# Patient Record
Sex: FEMALE | Race: WHITE | Employment: FULL TIME | URBAN - METROPOLITAN AREA
[De-identification: names, ages, dates, MRNs, and addresses within clinical notes are randomized per-mention and may not be internally consistent; named-entity substitution may affect disease eponyms.]

---

## 2017-02-04 ENCOUNTER — HOSPITAL ENCOUNTER (EMERGENCY)
Age: 52
Discharge: HOME | End: 2017-02-04

## 2017-02-04 VITALS
TEMPERATURE: 97.9 F | BODY MASS INDEX: 23.04 KG/M2 | HEIGHT: 63 IN | WEIGHT: 130 LBS | DIASTOLIC BLOOD PRESSURE: 89 MMHG | RESPIRATION RATE: 20 BRPM | HEART RATE: 73 BPM | SYSTOLIC BLOOD PRESSURE: 130 MMHG | OXYGEN SATURATION: 97 %

## 2017-02-04 DIAGNOSIS — F32.A DEPRESSION, UNSPECIFIED DEPRESSION TYPE: ICD-10-CM

## 2017-02-04 DIAGNOSIS — H66.002 ACUTE SUPPURATIVE OTITIS MEDIA OF LEFT EAR WITHOUT SPONTANEOUS RUPTURE OF TYMPANIC MEMBRANE, RECURRENCE NOT SPECIFIED: Primary | ICD-10-CM

## 2017-02-04 LAB
ALBUMIN SERPL-MCNC: 4.6 G/DL (ref 3.9–4.9)
ALP BLD-CCNC: 97 U/L (ref 40–130)
ALT SERPL-CCNC: 7 U/L (ref 0–33)
AMPHETAMINE SCREEN, URINE: NORMAL
ANION GAP SERPL CALCULATED.3IONS-SCNC: 16 MEQ/L (ref 7–13)
AST SERPL-CCNC: 14 U/L (ref 0–35)
BARBITURATE SCREEN URINE: NORMAL
BASOPHILS ABSOLUTE: 0.1 K/UL (ref 0–0.2)
BASOPHILS RELATIVE PERCENT: 1.2 %
BENZODIAZEPINE SCREEN, URINE: NORMAL
BILIRUB SERPL-MCNC: 0.3 MG/DL (ref 0–1.2)
BILIRUBIN URINE: NEGATIVE
BLOOD, URINE: NEGATIVE
BUN BLDV-MCNC: 9 MG/DL (ref 6–20)
CALCIUM SERPL-MCNC: 9.9 MG/DL (ref 8.6–10.2)
CANNABINOID SCREEN URINE: NORMAL
CHLORIDE BLD-SCNC: 105 MEQ/L (ref 98–107)
CLARITY: CLEAR
CO2: 21 MEQ/L (ref 22–29)
COCAINE METABOLITE SCREEN URINE: NORMAL
COLOR: YELLOW
CREAT SERPL-MCNC: 0.57 MG/DL (ref 0.5–0.9)
EOSINOPHILS ABSOLUTE: 0.1 K/UL (ref 0–0.7)
EOSINOPHILS RELATIVE PERCENT: 1.5 %
ETHANOL PERCENT: NORMAL G/DL
ETHANOL: <10 MG/DL (ref 0–0.08)
GFR AFRICAN AMERICAN: >60
GFR NON-AFRICAN AMERICAN: >60
GLOBULIN: 2.1 G/DL (ref 2.3–3.5)
GLUCOSE BLD-MCNC: 93 MG/DL (ref 74–109)
GLUCOSE URINE: NEGATIVE MG/DL
HCG QUALITATIVE: NEGATIVE
HCT VFR BLD CALC: 44.9 % (ref 37–47)
HEMOGLOBIN: 15.2 G/DL (ref 12–16)
KETONES, URINE: NEGATIVE MG/DL
LEUKOCYTE ESTERASE, URINE: NEGATIVE
LYMPHOCYTES ABSOLUTE: 2.6 K/UL (ref 1–4.8)
LYMPHOCYTES RELATIVE PERCENT: 25.9 %
Lab: NORMAL
MCH RBC QN AUTO: 31.6 PG (ref 27–31.3)
MCHC RBC AUTO-ENTMCNC: 33.7 % (ref 33–37)
MCV RBC AUTO: 93.5 FL (ref 82–100)
MONOCYTES ABSOLUTE: 0.6 K/UL (ref 0.2–0.8)
MONOCYTES RELATIVE PERCENT: 5.6 %
NEUTROPHILS ABSOLUTE: 6.7 K/UL (ref 1.4–6.5)
NEUTROPHILS RELATIVE PERCENT: 65.8 %
NITRITE, URINE: NEGATIVE
OPIATE SCREEN URINE: NORMAL
PDW BLD-RTO: 12.8 % (ref 11.5–14.5)
PH UA: 6.5 (ref 5–9)
PHENCYCLIDINE SCREEN URINE: NORMAL
PLATELET # BLD: 171 K/UL (ref 130–400)
POTASSIUM SERPL-SCNC: 4.1 MEQ/L (ref 3.5–5.1)
PROTEIN UA: NEGATIVE MG/DL
RBC # BLD: 4.8 M/UL (ref 4.2–5.4)
SODIUM BLD-SCNC: 142 MEQ/L (ref 132–144)
SPECIFIC GRAVITY UA: 1.02 (ref 1–1.03)
TOTAL PROTEIN: 6.7 G/DL (ref 6.4–8.1)
URINE REFLEX TO CULTURE: NORMAL
UROBILINOGEN, URINE: 0.2 E.U./DL
WBC # BLD: 10.1 K/UL (ref 4.8–10.8)

## 2017-02-04 PROCEDURE — 99283 EMERGENCY DEPT VISIT LOW MDM: CPT

## 2017-02-04 PROCEDURE — 80053 COMPREHEN METABOLIC PANEL: CPT

## 2017-02-04 PROCEDURE — 85025 COMPLETE CBC W/AUTO DIFF WBC: CPT

## 2017-02-04 PROCEDURE — 84703 CHORIONIC GONADOTROPIN ASSAY: CPT

## 2017-02-04 PROCEDURE — 6370000000 HC RX 637 (ALT 250 FOR IP): Performed by: NURSE PRACTITIONER

## 2017-02-04 PROCEDURE — 80307 DRUG TEST PRSMV CHEM ANLYZR: CPT

## 2017-02-04 PROCEDURE — 81003 URINALYSIS AUTO W/O SCOPE: CPT

## 2017-02-04 PROCEDURE — G0480 DRUG TEST DEF 1-7 CLASSES: HCPCS

## 2017-02-04 PROCEDURE — 36415 COLL VENOUS BLD VENIPUNCTURE: CPT

## 2017-02-04 RX ORDER — AMOXICILLIN AND CLAVULANATE POTASSIUM 875; 125 MG/1; MG/1
1 TABLET, FILM COATED ORAL 2 TIMES DAILY
Qty: 20 TABLET | Refills: 0 | Status: SHIPPED | OUTPATIENT
Start: 2017-02-04 | End: 2017-02-14

## 2017-02-04 RX ORDER — IBUPROFEN 400 MG/1
400 TABLET ORAL EVERY 8 HOURS PRN
Status: DISCONTINUED | OUTPATIENT
Start: 2017-02-04 | End: 2017-02-04 | Stop reason: HOSPADM

## 2017-02-04 RX ORDER — AMOXICILLIN AND CLAVULANATE POTASSIUM 875; 125 MG/1; MG/1
1 TABLET, FILM COATED ORAL EVERY 12 HOURS SCHEDULED
Status: DISCONTINUED | OUTPATIENT
Start: 2017-02-04 | End: 2017-02-04 | Stop reason: HOSPADM

## 2017-02-04 RX ORDER — IBUPROFEN 600 MG/1
600 TABLET ORAL EVERY 6 HOURS PRN
Qty: 30 TABLET | Refills: 0 | Status: SHIPPED | OUTPATIENT
Start: 2017-02-04 | End: 2020-09-29

## 2017-02-04 RX ORDER — AMOXICILLIN 875 MG/1
875 TABLET, COATED ORAL 2 TIMES DAILY
Qty: 20 TABLET | Refills: 0 | Status: SHIPPED | OUTPATIENT
Start: 2017-02-04 | End: 2017-02-14

## 2017-02-04 RX ADMIN — IBUPROFEN 400 MG: 400 TABLET, FILM COATED ORAL at 12:03

## 2017-02-04 RX ADMIN — IBUPROFEN 400 MG: 400 TABLET, FILM COATED ORAL at 12:02

## 2017-02-04 RX ADMIN — AMOXICILLIN AND CLAVULANATE POTASSIUM 1 TABLET: 875; 125 TABLET, FILM COATED ORAL at 11:59

## 2017-02-04 ASSESSMENT — ENCOUNTER SYMPTOMS
SORE THROAT: 0
RHINORRHEA: 0
ABDOMINAL PAIN: 0
VOMITING: 0
DIARRHEA: 0
COUGH: 0
HYPERVENTILATION: 0

## 2017-02-04 ASSESSMENT — PAIN SCALES - GENERAL
PAINLEVEL_OUTOF10: 6

## 2019-04-11 ENCOUNTER — HOSPITAL ENCOUNTER (OUTPATIENT)
Dept: GENERAL RADIOLOGY | Age: 54
Discharge: HOME | End: 2019-04-11

## 2019-04-11 DIAGNOSIS — S67.195A CRUSHING INJURY OF LEFT RING FINGER, INITIAL ENCOUNTER: ICD-10-CM

## 2020-01-18 ENCOUNTER — OFFICE VISIT (OUTPATIENT)
Dept: FAMILY MEDICINE CLINIC | Age: 55
End: 2020-01-18

## 2020-01-18 VITALS
OXYGEN SATURATION: 98 % | TEMPERATURE: 98.3 F | HEIGHT: 63 IN | BODY MASS INDEX: 21.65 KG/M2 | WEIGHT: 122.2 LBS | RESPIRATION RATE: 14 BRPM | DIASTOLIC BLOOD PRESSURE: 68 MMHG | SYSTOLIC BLOOD PRESSURE: 132 MMHG | HEART RATE: 67 BPM

## 2020-01-18 PROCEDURE — 99213 OFFICE O/P EST LOW 20 MIN: CPT | Performed by: NURSE PRACTITIONER

## 2020-01-18 RX ORDER — CIPROFLOXACIN HYDROCHLORIDE 3.5 MG/ML
SOLUTION/ DROPS TOPICAL
Qty: 1 BOTTLE | Refills: 0 | Status: SHIPPED | OUTPATIENT
Start: 2020-01-18 | End: 2020-01-25

## 2020-01-18 RX ORDER — CIPROFLOXACIN HYDROCHLORIDE 3.5 MG/ML
SOLUTION/ DROPS TOPICAL
Qty: 1 BOTTLE | Refills: 0 | Status: SHIPPED | OUTPATIENT
Start: 2020-01-18 | End: 2020-01-18

## 2020-01-18 ASSESSMENT — PATIENT HEALTH QUESTIONNAIRE - PHQ9
2. FEELING DOWN, DEPRESSED OR HOPELESS: 0
SUM OF ALL RESPONSES TO PHQ QUESTIONS 1-9: 0
SUM OF ALL RESPONSES TO PHQ QUESTIONS 1-9: 0
SUM OF ALL RESPONSES TO PHQ9 QUESTIONS 1 & 2: 0
1. LITTLE INTEREST OR PLEASURE IN DOING THINGS: 0

## 2020-01-18 ASSESSMENT — ENCOUNTER SYMPTOMS
DOUBLE VISION: 0
EYE DISCHARGE: 0
EYE REDNESS: 1
EYE ITCHING: 1
EYE PAIN: 1
FOREIGN BODY SENSATION: 0
BLURRED VISION: 0
PHOTOPHOBIA: 0

## 2020-01-18 NOTE — PROGRESS NOTES
Lifestyle    Physical activity:     Days per week: Not on file     Minutes per session: Not on file    Stress: Not on file   Relationships    Social connections:     Talks on phone: Not on file     Gets together: Not on file     Attends Orthodox service: Not on file     Active member of club or organization: Not on file     Attends meetings of clubs or organizations: Not on file     Relationship status: Not on file    Intimate partner violence:     Fear of current or ex partner: Not on file     Emotionally abused: Not on file     Physically abused: Not on file     Forced sexual activity: Not on file   Other Topics Concern    Not on file   Social History Narrative    Not on file     History reviewed. No pertinent family history. No Known Allergies      Review of Systems   Constitutional: Negative for activity change, appetite change and fever. Eyes: Positive for pain, redness and itching. Negative for blurred vision, double vision, photophobia, discharge and visual disturbance. Objective:   /68 (Site: Left Upper Arm, Position: Sitting, Cuff Size: Medium Adult)   Pulse 67   Temp 98.3 °F (36.8 °C) (Temporal)   Resp 14   Ht 5' 3\" (1.6 m)   Wt 122 lb 3.2 oz (55.4 kg)   SpO2 98%   BMI 21.65 kg/m²     Physical Exam  Vitals signs reviewed. Constitutional:       General: She is not in acute distress. Appearance: Normal appearance. She is not ill-appearing. HENT:      Head: Normocephalic and atraumatic. Eyes:      General: Lids are normal.      Extraocular Movements: Extraocular movements intact. Conjunctiva/sclera:      Right eye: Hemorrhage present. Pupils: Pupils are equal, round, and reactive to light. Cardiovascular:      Rate and Rhythm: Normal rate and regular rhythm. Pulses: Normal pulses. Heart sounds: Normal heart sounds. Pulmonary:      Effort: Pulmonary effort is normal.      Breath sounds: Normal breath sounds. No wheezing, rhonchi or rales. Skin:     General: Skin is warm and dry. Capillary Refill: Capillary refill takes less than 2 seconds. Neurological:      Mental Status: She is alert and oriented to person, place, and time. Psychiatric:         Mood and Affect: Mood normal.         Assessment:       Diagnosis Orders   1. Abrasion of right cornea, initial encounter  Amb External Referral To Ophthalmology   2. Acute bacterial conjunctivitis of right eye  Amb External Referral To Ophthalmology    ciprofloxacin (CILOXAN) 0.3 % ophthalmic solution    DISCONTINUED: ciprofloxacin (CILOXAN) 0.3 % ophthalmic solution      No results found for this visit on 01/18/20. Plan:   Call your doctor now or seek immediate medical care if:    · You have signs of an eye infection, such as:  ? Pus or thick discharge coming from the eye.  ? Redness or swelling around the eye.  ? A fever.     · You have new or worse eye pain.     · You have vision changes.     · It feels like there is something in your eye.     · Light hurts your eye.    Watch closely for changes in your health, and be sure to contact your doctor if:    · You do not get better as expected. Discussed with patient/family worsening signs and symptoms as outlined above as to when to return for care/seek emergent help. Assessment & Plan   Inez Smith was seen today for eye problem. Diagnoses and all orders for this visit:    Abrasion of right cornea, initial encounter  -     Amb External Referral To Ophthalmology    Acute bacterial conjunctivitis of right eye  -     Discontinue: ciprofloxacin (CILOXAN) 0.3 % ophthalmic solution; 2 drops to affected eye(s) Q2H x 2 days, then Q4H x 5 days while awake. -     Amb External Referral To Ophthalmology  -     ciprofloxacin (CILOXAN) 0.3 % ophthalmic solution; 2 drops to affected eye(s) Q2H x 2 days, then Q4H x 5 days while awake.       Orders Placed This Encounter   Procedures    Amb External Referral To Ophthalmology     Referral Priority: Urgent     Referral Type:   Eval and Treat     Referral Reason:   Specialty Services Required     Referred to Provider:   Peggy Morgan     Requested Specialty:   Optometry     Number of Visits Requested:   1     Orders Placed This Encounter   Medications    DISCONTD: ciprofloxacin (CILOXAN) 0.3 % ophthalmic solution     Si drops to affected eye(s) Q2H x 2 days, then Q4H x 5 days while awake. Dispense:  1 Bottle     Refill:  0    ciprofloxacin (CILOXAN) 0.3 % ophthalmic solution     Si drops to affected eye(s) Q2H x 2 days, then Q4H x 5 days while awake. Dispense:  1 Bottle     Refill:  0     Medications Discontinued During This Encounter   Medication Reason    sertraline (ZOLOFT) 50 MG tablet LIST CLEANUP    ciprofloxacin (CILOXAN) 0.3 % ophthalmic solution      Return for Follow up w/ PCP if symptoms worsen or go to ED. Reviewed with the patient/family: current clinical status & medications. Side effects of the medication prescribed today, as well as treatment plan/rationale and result expectations have been discussed with the patient/family who expresses understanding. Follow up with PCP to evaluate treatment results or return if symptoms worsen or fail to improve. I have reviewed the patient's medical history in detail and updated the computerized patient record.     Arie Kidney, APRN - NP

## 2020-01-18 NOTE — PATIENT INSTRUCTIONS
Patient Education        Corneal Scratches: Care Instructions  Your Care Instructions    The cornea is the clear surface that covers the front of the eye. When a speck of dirt, a wood chip, an insect, or another object flies into your eye, it can cause a painful scratch on the cornea. Wearing contact lenses too long or rubbing your eyes can also scratch the cornea. Small scratches usually heal in a day or two. Deeper scratches may take longer. If you have had a foreign object removed from your eye or you have a corneal scratch, you will need to watch for infection and vision problems while your eye heals. Follow-up care is a key part of your treatment and safety. Be sure to make and go to all appointments, and call your doctor if you are having problems. It's also a good idea to know your test results and keep a list of the medicines you take. How can you care for yourself at home? · The doctor probably used a medicine during your exam to numb your eye. When it wears off in 30 to 60 minutes, your eye pain may come back. Take pain medicines exactly as directed. ? If the doctor gave you a prescription medicine for pain, take it as prescribed. ? If you are not taking a prescription pain medicine, ask your doctor if you can take an over-the-counter medicine. ? Do not take two or more pain medicines at the same time unless the doctor told you to. Many pain medicines have acetaminophen, which is Tylenol. Too much acetaminophen (Tylenol) can be harmful. · Do not rub your injured eye. Rubbing can make it worse. · Use the prescribed eyedrops or ointment as directed. Be sure the dropper or bottle tip is clean. To put in eyedrops or ointment:  ? Tilt your head back, and pull your lower eyelid down with one finger. ? Drop or squirt the medicine inside the lower lid. ? Close your eye for 30 to 60 seconds to let the drops or ointment move around.   ? Do not touch the ointment or dropper tip to your eyelashes or any other surface. · Do not use your contact lens in your hurt eye until your doctor says you can. Also, do not wear eye makeup until your eye has healed. · Do not drive if you have blurred vision. · Bright light may hurt. Sunglasses can help. · To prevent eye injuries in the future, wear safety glasses or goggles when you work with machines or tools, mow the lawn, or ride a bike or motorcycle. When should you call for help? Call your doctor now or seek immediate medical care if:    · You have signs of an eye infection, such as:  ? Pus or thick discharge coming from the eye.  ? Redness or swelling around the eye.  ? A fever.     · You have new or worse eye pain.     · You have vision changes.     · It feels like there is something in your eye.     · Light hurts your eye.    Watch closely for changes in your health, and be sure to contact your doctor if:    · You do not get better as expected. Where can you learn more? Go to https://[employer]. org and sign in to your [employer] account. Enter D359 in the [facility] box to learn more about \"Corneal Scratches: Care Instructions. \"     If you do not have an account, please click on the \"Sign Up Now\" link. Current as of: May 5, 2019  Content Version: 12.3  © 8523-7778 Healthwise, Incorporated. Care instructions adapted under license by [facility] ([facility]). If you have questions about a medical condition or this instruction, always ask your healthcare professional. [place] [address] any warranty or liability for your use of this information. Patient Education        Pinkeye: Care Instructions  Your Care Instructions    Pinkeye is redness and swelling of the eye surface and the conjunctiva (the lining of the eyelid and the covering of the white part of the eye). Pinkeye is also called conjunctivitis. Pinkeye is often caused by infection with bacteria or a virus.  Dry air, allergies, smoke, and chemicals are other common causes. Pinkeye often clears on its own in 7 to 10 days. Antibiotics only help if the pinkeye is caused by bacteria. Pinkeye caused by infection spreads easily. If an allergy or chemical is causing pinkeye, it will not go away unless you can avoid whatever is causing it. Follow-up care is a key part of your treatment and safety. Be sure to make and go to all appointments, and call your doctor if you are having problems. It's also a good idea to know your test results and keep a list of the medicines you take. How can you care for yourself at home? · Wash your hands often. Always wash them before and after you treat pinkeye or touch your eyes or face. · Use moist cotton or a clean, wet cloth to remove crust. Wipe from the inside corner of the eye to the outside. Use a clean part of the cloth for each wipe. · Put cold or warm wet cloths on your eye a few times a day if the eye hurts. · Do not wear contact lenses or eye makeup until the pinkeye is gone. Throw away any eye makeup you were using when you got pinkeye. Clean your contacts and storage case. If you wear disposable contacts, use a new pair when your eye has cleared and it is safe to wear contacts again. · If the doctor gave you antibiotic ointment or eyedrops, use them as directed. Use the medicine for as long as instructed, even if your eye starts looking better soon. Keep the bottle tip clean, and do not let it touch the eye area. · To put in eyedrops or ointment:  ? Tilt your head back, and pull your lower eyelid down with one finger. ? Drop or squirt the medicine inside the lower lid. ? Close your eye for 30 to 60 seconds to let the drops or ointment move around. ? Do not touch the ointment or dropper tip to your eyelashes or any other surface. · Do not share towels, pillows, or washcloths while you have pinkeye. When should you call for help?   Call your doctor now or seek immediate medical care if:    · You have pain in your eye, not just irritation on the surface.     · You have a change in vision or loss of vision.     · You have an increase in discharge from the eye.     · Your eye has not started to improve or begins to get worse within 48 hours after you start using antibiotics.     · Pinkeye lasts longer than 7 days.    Watch closely for changes in your health, and be sure to contact your doctor if you have any problems. Where can you learn more? Go to https://Mobicowpepiceweb.[employer] [email or web address] org and sign in to your [employer] account. Enter Y392 in the [employer] box to learn more about \"Pinkeye: Care Instructions. \"     If you do not have an account, please click on the \"Sign Up Now\" link. Current as of: June 26, 2019  Content Version: 12.3  © 0607-2700 Healthwise, Incorporated. Care instructions adapted under license by [facility] ([facility]). If you have questions about a medical condition or this instruction, always ask your healthcare professional. [address] any warranty or liability for your use of this information.

## 2020-09-29 ENCOUNTER — HOSPITAL ENCOUNTER (EMERGENCY)
Age: 55
Discharge: HOME | End: 2020-09-29
Attending: EMERGENCY MEDICINE
Payer: COMMERCIAL

## 2020-09-29 ENCOUNTER — APPOINTMENT (OUTPATIENT)
Dept: CT IMAGING | Age: 55
End: 2020-09-29
Payer: COMMERCIAL

## 2020-09-29 VITALS
TEMPERATURE: 99.8 F | DIASTOLIC BLOOD PRESSURE: 68 MMHG | HEIGHT: 62 IN | OXYGEN SATURATION: 97 % | BODY MASS INDEX: 21.16 KG/M2 | HEART RATE: 56 BPM | SYSTOLIC BLOOD PRESSURE: 124 MMHG | WEIGHT: 115 LBS | RESPIRATION RATE: 16 BRPM

## 2020-09-29 PROCEDURE — 96372 THER/PROPH/DIAG INJ SC/IM: CPT

## 2020-09-29 PROCEDURE — 99284 EMERGENCY DEPT VISIT MOD MDM: CPT

## 2020-09-29 PROCEDURE — 70450 CT HEAD/BRAIN W/O DYE: CPT

## 2020-09-29 PROCEDURE — 6360000002 HC RX W HCPCS: Performed by: EMERGENCY MEDICINE

## 2020-09-29 RX ORDER — KETOROLAC TROMETHAMINE 30 MG/ML
30 INJECTION, SOLUTION INTRAMUSCULAR; INTRAVENOUS ONCE
Status: COMPLETED | OUTPATIENT
Start: 2020-09-29 | End: 2020-09-29

## 2020-09-29 RX ADMIN — KETOROLAC TROMETHAMINE 30 MG: 30 INJECTION, SOLUTION INTRAMUSCULAR; INTRAVENOUS at 02:42

## 2020-09-29 ASSESSMENT — PAIN DESCRIPTION - FREQUENCY: FREQUENCY: CONTINUOUS

## 2020-09-29 ASSESSMENT — PAIN SCALES - GENERAL
PAINLEVEL_OUTOF10: 8
PAINLEVEL_OUTOF10: 8

## 2020-09-29 ASSESSMENT — PAIN DESCRIPTION - LOCATION: LOCATION: HEAD

## 2020-09-29 ASSESSMENT — PAIN DESCRIPTION - DESCRIPTORS: DESCRIPTORS: STABBING;SHARP

## 2020-09-29 ASSESSMENT — PAIN DESCRIPTION - PAIN TYPE: TYPE: ACUTE PAIN

## 2020-09-29 NOTE — ED NOTES
Patient is alert and oriented in room. Skin is pink, warm, dry. Respirations are even and non-labored. Patient is sitting up on cart conversing with visitor that is in room. Passed swallow evaluation.      Patti Barrett, RN  09/29/20 [id number] Sad[address] Olivares  Lasha Reddy, RAJANI  09/29/20 [id number]

## 2020-09-29 NOTE — ED TRIAGE NOTES
Pt c/o headache since Ryley morning. States she was at work pushing a cart of supplies when a case of batteries fell onto her head. States she has had a headache ever since then. Pt denies LOC. Pt alert and oriented x 4. Skin pink, warm, dry. Respirations even and unlabored. No distress noted at this time.

## 2020-09-29 NOTE — ED NOTES
Call placed to lab to check for post-accident drug/alcohol screening.      Miguel A Stephenson RN  09/29/20 [id number]

## 2020-10-26 ENCOUNTER — HOSPITAL ENCOUNTER (OUTPATIENT)
Dept: OCCUPATIONAL THERAPY | Age: 55
Setting detail: THERAPIES SERIES
Discharge: HOME | End: 2020-10-26
Payer: COMMERCIAL

## 2020-10-26 PROCEDURE — 97166 OT EVAL MOD COMPLEX 45 MIN: CPT

## 2020-10-28 ENCOUNTER — HOSPITAL ENCOUNTER (OUTPATIENT)
Dept: OCCUPATIONAL THERAPY | Age: 55
Setting detail: THERAPIES SERIES
Discharge: HOME | End: 2020-10-28
Payer: COMMERCIAL

## 2020-10-28 PROCEDURE — 97012 MECHANICAL TRACTION THERAPY: CPT

## 2020-10-28 PROCEDURE — 97140 MANUAL THERAPY 1/> REGIONS: CPT

## 2020-10-28 PROCEDURE — 97530 THERAPEUTIC ACTIVITIES: CPT

## 2020-10-28 NOTE — PROGRESS NOTES
turning toward Right side only. Instructed pt. to be aware of her posture in standing, sitting and lying supine. Education/HEP: supine chin tuck HEP. Discussed previous HEP: n/a, N/A    Comments:   Pt. demonstrates good understanding of HEP. Assessment:   Pt tolerated treatment well. Pt reported increased  pain after OT treatment. Noted improved posture of head, neck and shoulders after treatment.     Plan:   Continue [facility] [place], OTR/L   10/28/2020  5:35 PM

## 2020-11-02 ENCOUNTER — HOSPITAL ENCOUNTER (OUTPATIENT)
Dept: OCCUPATIONAL THERAPY | Age: 55
Setting detail: THERAPIES SERIES
Discharge: HOME | End: 2020-11-02
Payer: COMMERCIAL

## 2020-11-02 PROCEDURE — 97530 THERAPEUTIC ACTIVITIES: CPT

## 2020-11-02 PROCEDURE — 97140 MANUAL THERAPY 1/> REGIONS: CPT

## 2020-11-02 PROCEDURE — 97012 MECHANICAL TRACTION THERAPY: CPT

## 2020-11-02 NOTE — PROGRESS NOTES
Occupational Therapy  Daily Note     Name: Chen Betts  : 1965  MRN: [id number]  Diagnosis: cervical sprain    Visit Information:   Onset Date: 20  OT Insurance Information: Leda Goncalves  Progress Note Counter: 3    Date: 2020  Time:   OT Mechanical traction for 10 minutes for 1 unit, CPT 66892    1:02-1:12 pm  OT Manual therapy 35 minutes for 2 unit(s), CPT 44079    1:12-1:47 pm  OT Therapeutic activities 12 minutes for 1 unit(s), CPT 67349   1:47-1:59 pm  Moist heat at end of session x 13 minutes(no charge)   1:59-2:12 pm     OT Individual Minutes  Time In: 1302  Time Out: 9421  Minutes: 70    Referring Practitioner: Dr. Tia Pugh       Subjective:     \"I was really sore after you treated me last time, but I actually feel better in my neck area. I am having more issues today a little farther down, almost between my shoulder blades. \"    Pain rating:   Pre-treatment pain:    4/10  Cervical spine    5-6/10  thoracic spine    Action for pain:   mechanical traction cervical area. Pain after treatment:      5/10         Focus of treatment was on the following:   decreasing fascial restrictions and decreasing pain     Objective:    Treatment Activity:     Modality:     mechanical traction cervical spine x 10 minutes at 10 lb. pressure while supine. MFR/Manual:   Craniosacral release:  Occipital release with anterior chest release while supine. Bilateral trapezius muscle massage and trigger point releases tolerated while supine. Pt. moved to prone position with pillows under chest and rib cage , for cross hands technique along paraspinals between scapulas. Scapular mobilization performed to left scapula. In four point position, pt. performed gentle spinal flexion/extension with neck flexion/extension AROM with mild increase in pain. In sitting position, instructed pt. in postural techniques to decrease forward head position.   Pt. has difficulty maintaining head in midline due to pain.      Provided pt. with moist heat cervical and trapezius area after treatment session to assist with decreasing pain. Education/HEP: postural techniques     Discussed previous HEP: yes, Pt verbally confirmed compliant with HEP's    Comments:     Assessment:   Pt tolerated treatment fairly well. Pain complaints this date in posterior cervical area and mid thoracic area. Pain increased with improved alignment of head and neck, but pt. aware of need for improved postural alignment. Pt. demonstrates good understanding of HEP. Plan:   Continue POC    Goals:     Long term goals  Long term goal 1: Patient will report pain 3-4 or less during functional activities. Long term goal 2: Patient  will be independent with all recommended HEP's, adaptive strategies, and adaptive techniques. Long term goal 3: Patient will report decreased frequency of numbness/tingling in BUEs  Long term goal 4: Patient  will increase BUE shoulder strength from current by 1/2  to increase performance with I/ADL's. Long term goal 5: Patient will decrease fascial restrictions in neck and trapezius muscles to decrease pain during functional activities  Long term goals 6: Patient will be IND with ECWS techniques  Long term goal 7: Pt. will demonstrate good postural set for head and neck during all lifting tasks and standing for improved work tolerance.     JULIAN Cobb/L   11/2/2020  3:47 PM

## 2020-11-04 ENCOUNTER — HOSPITAL ENCOUNTER (OUTPATIENT)
Dept: OCCUPATIONAL THERAPY | Age: 55
Setting detail: THERAPIES SERIES
Discharge: HOME | End: 2020-11-04
Payer: COMMERCIAL

## 2020-11-04 PROCEDURE — 97012 MECHANICAL TRACTION THERAPY: CPT

## 2020-11-04 PROCEDURE — 97530 THERAPEUTIC ACTIVITIES: CPT

## 2020-11-04 PROCEDURE — 97140 MANUAL THERAPY 1/> REGIONS: CPT

## 2020-11-04 NOTE — PROGRESS NOTES
Occupational Therapy  Daily Note     Name: Ritu Coronado  : 1965  MRN: [id number]  Diagnosis: cervical sprain    Visit Information:   Onset Date: 20  OT Insurance Information: [employer]  Progress Note Counter: 4    Date: 2020  Time:   OT Manual therapy 37 minutes for 2 unit(s), CPT 56236    1:15-1:52 PM  OT Therapeutic activities 10 minutes for 1 unit(s), CPT 08017   1:52-2:O2 PM  OT Mechanical Traction 13 minutes for 1 unit(s), CPT 97457    1:02-1:15 PM       OT Individual Minutes  Time In: 0534  Time Out: 3234  Minutes: 61    Referring Practitioner: Dr. Oziel Tobar       Subjective:     \"I was really sore and painful after our last session, but today I could move better. \"    Pain rating:   Pre-treatment pain:    5/10  B trap muscles and posterior neck. Action for pain:   Patient reports pain is at acceptable level for treatment. Pain after treatment:      4/10         Focus of treatment was on the following:   decreasing fascial restrictions and decreasing pain     Objective:    Treatment Activity:     Modality:     Mechanical traction cervical spine x 10 lb. for 10 minutes while supine on mat. MFR/Manual:   Pt. tolerated soft tissue massage and mobilization of bilateral trapezius muscles while supine on mat. While maintaining compression downward on B trap muscle, pt. laterally flexed neck side to side. Transverse plane release completed anterior chest and thoracic spine to relieve tightness of anterior chest.  PROM and stretch provided to bilateral pectoralis muscles in supine also. Pt. performed postural techniques while supine with improved alignment noted. In seated position, pt. instructed in and performed cervical and postural alignment activities with good results. Pt. demonstrates good understanding and follow through. Discussed previous HEP: yes, Pt verbally confirmed compliant with HEP's    Comments:     Assessment:   Pt tolerated treatment well.   Pain reduced

## 2020-11-09 ENCOUNTER — HOSPITAL ENCOUNTER (OUTPATIENT)
Dept: OCCUPATIONAL THERAPY | Age: 55
Setting detail: THERAPIES SERIES
Discharge: HOME | End: 2020-11-09
Payer: COMMERCIAL

## 2020-11-09 PROCEDURE — 97012 MECHANICAL TRACTION THERAPY: CPT

## 2020-11-09 PROCEDURE — 97530 THERAPEUTIC ACTIVITIES: CPT

## 2020-11-09 PROCEDURE — 97140 MANUAL THERAPY 1/> REGIONS: CPT

## 2020-11-09 NOTE — PROGRESS NOTES
Occupational Therapy  Daily Note     Name: Royal Alcaraz  : 1965  MRN: [id number]  Diagnosis: cervical sprain    Visit Information:   Onset Date: 20  OT Insurance Information: [employer]  Progress Note Counter: 5    Date: 2020  Time:   OT Manual therapy 37 minutes for 2 unit(s), CPT 46036      1:20-1:57  pm  OT Therapeutic activities 8 minutes for 1 unit(s), CPT 01959   1:57-2:05 pm  OT mechanical traction 10 minutes for 1 unit(s), CPT 88614   1:10-1:20 pm     OT Individual Minutes  Time In: 1310  Time Out: 5500  Minutes: 54    Referring Practitioner: Dr. Ra Jung       Subjective:     \"I am feeling so much better in my shoulders and head. \"    Pain rating:   Pre-treatment pain:    3/10    Action for pain:   No action necessary. Pain after treatment:      6/10         Focus of treatment was on the following:   decreasing fascial restrictions and decreasing pain     Objective:    Treatment Activity:     Modality:     mechanical traction cervical spine while supine x 10 minutes with 10 lbs. MFR/Manual:   Cervical: cervical hold with anterior chest release and light manual cervical traction with side bending, each direction. Soft tissue mobilization posterior neck and upper thoracic area with light downward compression of shoulder girdle. Transverse plane release completed over left lateral chest and posterior scapula due to report of discomfort. Pt. followed through with slow controlled AROM of head, neck and scapular retraction BUEs. BUE AROM shoulder flexion and horizontal ab/adduction completed with straight cane while supine. In prone, pt. transitioned to prone prop on forearms with trunk and neck extension activities. Pt. able to hold posture for 30 second intervals. Education:  Continued with instruction regarding posture during all ADL. Discussed previous HEP: yes, Pt verbally confirmed compliant with HEP's    Comments:     Assessment:   Pt tolerated treatment well. Pt. tends to have slight increase in pain following treatment, however reports improved symptoms and mobility after several hours. Pt. progressing well. Pt. reports she was able to return to work on light duty performing her  position. Plan:   Continue POC    Goals:     Long term goals  Long term goal 1: Patient will report pain 3-4 or less during functional activities. Long term goal 2: Patient  will be independent with all recommended HEP's, adaptive strategies, and adaptive techniques. Long term goal 3: Patient will report decreased frequency of numbness/tingling in BUEs  Long term goal 4: Patient  will increase BUE shoulder strength from current by 1/2  to increase performance with I/ADL's. Long term goal 5: Patient will decrease fascial restrictions in neck and trapezius muscles to decrease pain during functional activities  Long term goals 6: Patient will be IND with ECWS techniques  Long term goal 7: Pt. will demonstrate good postural set for head and neck during all lifting tasks and standing for improved work tolerance.     Clive Alejo, OTR/L   11/9/2020  4:39 PM

## 2020-11-11 ENCOUNTER — HOSPITAL ENCOUNTER (OUTPATIENT)
Dept: OCCUPATIONAL THERAPY | Age: 55
Setting detail: THERAPIES SERIES
Discharge: HOME | End: 2020-11-11
Payer: COMMERCIAL

## 2020-11-11 PROCEDURE — 97012 MECHANICAL TRACTION THERAPY: CPT

## 2020-11-11 PROCEDURE — 97140 MANUAL THERAPY 1/> REGIONS: CPT

## 2020-11-11 PROCEDURE — 97530 THERAPEUTIC ACTIVITIES: CPT

## 2020-11-16 ENCOUNTER — HOSPITAL ENCOUNTER (OUTPATIENT)
Dept: OCCUPATIONAL THERAPY | Age: 55
Setting detail: THERAPIES SERIES
Discharge: HOME | End: 2020-11-16
Payer: COMMERCIAL

## 2020-11-16 PROCEDURE — 97140 MANUAL THERAPY 1/> REGIONS: CPT

## 2020-11-16 PROCEDURE — 97530 THERAPEUTIC ACTIVITIES: CPT

## 2020-11-16 PROCEDURE — 97012 MECHANICAL TRACTION THERAPY: CPT

## 2020-11-16 NOTE — PROGRESS NOTES
Occupational Therapy  Daily Note     Name: Corina Maza  : 1965  MRN: [id number]  Diagnosis: cervical sprain    Visit Information:   Onset Date: 20  [facility]  Progress Note Counter: 7    Date: 2020  Time:   OT Mechanical traction 10 minutes for 1 unit(s), CPT 23104  1:01-1:11 PM  OT Manual therapy 13 minutes for 1 unit(s), CPT 41187    1:11-1:24 PM  OT Therapeutic activities 36 minutes for 2 unit(s), CPT 09551    1:24-2:00 PM       OT Individual Minutes  Time In: 1301  Time Out: 1400  Minutes: 61    Referring Practitioner: Dr. Willy Sahni       Subjective:     \"I am doing and feeling better, I am still on light duty at work. \"    Pain rating:   Pre-treatment pain:    3/10   Pt. states, \"Achy posterior neck and trap muscles. \"    Action for pain:   mechanical traction    Pain after treatment:      3/10     Pt. states, \"But it feels less achy and less tense. \"    Focus of treatment was on the following:   assess for progress toward goals and strengthening upper body posture. Objective:    Treatment Activity:     Modality:     Patient was screened for contraindications prior to use of modality. Mechanical traction cervical spine while supine x 10 minutes @ 10 lbs. continuous. MFR/Manual:   Soft tissue massage and mobilization over trigger points bilateral trapezius muscle area while seated. RUE scapular mobilization focusing on depression motion for added stretch. Pt. instructed in and performed self mobilization technique using tennis ball in stockinette to mobilize trap. muscle area as HEP. Pt. also instructed in and performed medium resistance red theraband HEP for shoulder/ neck stability exercises with posture. Pt. provided with theraband and stockinette. Pt. demonstrates good understanding and follow through. After session complete, pt. provided moist heat cervical wrap per her request for comfort.     Education/HEP: self mobilization and red theraband HEP.     Discussed previous HEP: yes, Pt verbally confirmed compliant with HEP's    Comments:     Assessment:   Pt tolerated treatment well. Pt. progressing well with improved mobility and decreased pain. Pt. continues to have mildly decreased postural strength upper body. Pt. would benefit from further OT visits to address strength and work simulation for lifting for return to work without restrictions. Pt. has met goals 2,3,5 and 6. Plan:   Continue POC. Pt. to follow up with physician. Goals:     Long term goals  Long term goal 1: Patient will report pain 3-4 or less during functional activities. Long term goal 2: Patient  will be independent with all recommended HEP's, adaptive strategies, and adaptive techniques. Long term goal 3: Patient will report decreased frequency of numbness/tingling in BUEs  Long term goal 4: Patient  will increase BUE shoulder strength from current by 1/2  to increase performance with I/ADL's. Long term goal 5: Patient will decrease fascial restrictions in neck and trapezius muscles to decrease pain during functional activities  Long term goals 6: Patient will be IND with ECWS techniques  Long term goal 7: Pt. will demonstrate good postural set for head and neck during all lifting tasks and standing for improved work tolerance.     Gregoria Hutchinson OTR/L   11/17/2020  9:52 AM

## 2020-11-18 ENCOUNTER — HOSPITAL ENCOUNTER (OUTPATIENT)
Dept: OCCUPATIONAL THERAPY | Age: 55
Setting detail: THERAPIES SERIES
Discharge: HOME | End: 2020-11-18
Payer: COMMERCIAL

## 2020-11-18 PROCEDURE — 97012 MECHANICAL TRACTION THERAPY: CPT

## 2020-11-18 PROCEDURE — 97530 THERAPEUTIC ACTIVITIES: CPT

## 2020-11-18 PROCEDURE — 97140 MANUAL THERAPY 1/> REGIONS: CPT

## 2020-11-18 NOTE — PROGRESS NOTES
Occupational Therapy  Daily Note     Name: Zeferino Connolly  : 1965  MRN: [id number]  Diagnosis: cervical sprain    Visit Information:   Onset Date: 20  OT Insurance Information: [employer]  Progress Note Counter: 8    Date: 2020  Time:   OT Manual therapy 30 minutes for 2 unit(s), CPT 84453   1:10 pm-1:40 pm  OT Therapeutic activities 20 minutes for 1 unit(s), CPT 70096   1:40-2:00 pm  OT Mechanical traction 10 minutes for 1 unit(s), [id number]   1:00-1:10 pm     OT Individual Minutes  Time In: 1300  Time Out: 1400  Minutes: 61    Referring Practitioner: Dr. Jeffrey Polanco          Subjective:     \"I am doing so much better, but I feel weak. I still get achy between my neck down to my shoulder blades'\" pt. reports. Pain rating:   Pre-treatment pain:    3/10-4/10 cervical-thoracic spine area    Action for pain:   No action necessary. Pain after treatment:      2/10         Focus of treatment was on the following:   Decreasing pain and improving mobility. Objective:    Treatment Activity:     Modality:     mechanical traction cervical apine x 11 lbs x 10 minutes while supine. MFR/Manual:   Cervical: cervical release for left  side bending  and cervical hold with anterior chest release    Thoracic: bilateral  thoracic paraspinals     Pt. tolerated all treatment in supine position. Pt. performed AROM BUEs shoulder flexion while supine while maintaining trunk against mat. Pt. performed scapular depression tasks while actively rotating head side to side and laterally for increased mobility. Pt. reports mild pain with rotation to left. General UE endurance completed by performing 3 minutes on UBE at level 1. Discussed previous HEP: yes, Pt verbally confirmed compliant with HEP's Pt with good. follow through. Instructed pt. to increase stretching program.    Comments:     Assessment:   Pt tolerated treatment well.   Pain managed at 2-3 level and pt. able to perform with improved strength upper body and improved postural alignment. Pt. could benefit from continued treatment for general conditioning and upper body strength for RTW without restrictions. Plan:   Pt on hold for pending further authorization. Goals:     Long term goals  Long term goal 1: Patient will report pain 3-4 or less during functional activities. Long term goal 2: Patient  will be independent with all recommended HEP's, adaptive strategies, and adaptive techniques. Long term goal 3: Patient will report decreased frequency of numbness/tingling in BUEs  Long term goal 4: Patient  will increase BUE shoulder strength from current by 1/2  to increase performance with I/ADL's. Long term goal 5: Patient will decrease fascial restrictions in neck and trapezius muscles to decrease pain during functional activities  Long term goals 6: Patient will be IND with ECWS techniques  Long term goal 7: Pt. will demonstrate good postural set for head and neck during all lifting tasks and standing for improved work tolerance.     Gregoria Hutchinson OTR/L   11/18/2020  5:10 PM

## 2020-11-23 ENCOUNTER — HOSPITAL ENCOUNTER (OUTPATIENT)
Dept: OCCUPATIONAL THERAPY | Age: 55
Setting detail: THERAPIES SERIES
Discharge: HOME | End: 2020-11-23
Payer: COMMERCIAL

## 2020-11-23 NOTE — PROGRESS NOTES
Therapy                            Cancellation/No-show Note    Date: 2020  Patient Name: Andra Dietz    : 1965  (48 y.o.)     MRN: [id number]    Account #: [de-identified]            Comments: For today's appointment patient:  [x]  Cancelled  []  Rescheduled appointment  []  No-show   []  Called pt to remind of next appointment     Reason given by patient:  []  Patient ill  []  Conflicting appointment  []  No transportation    []  Conflict with work  []  No reason given  [x]  Other: pending new C9 approval.     [] Pt has future appointments scheduled, no follow up needed  [] Pt requests to be on hold. Reason:   If > 2 weeks please discuss with therapist.  [x] Therapist to call pt for follow up when approval received.      Signature: Electronically signed by ALFONSO Vasquez on 20 at 11:58 AM EST

## 2020-11-25 ENCOUNTER — HOSPITAL ENCOUNTER (OUTPATIENT)
Dept: OCCUPATIONAL THERAPY | Age: 55
Setting detail: THERAPIES SERIES
Discharge: HOME | End: 2020-11-25
Payer: COMMERCIAL

## 2020-11-30 ENCOUNTER — APPOINTMENT (OUTPATIENT)
Dept: OCCUPATIONAL THERAPY | Age: 55
End: 2020-11-30
Payer: COMMERCIAL

## 2020-12-02 ENCOUNTER — APPOINTMENT (OUTPATIENT)
Dept: OCCUPATIONAL THERAPY | Age: 55
End: 2020-12-02
Payer: COMMERCIAL

## 2020-12-21 ENCOUNTER — HOSPITAL ENCOUNTER (OUTPATIENT)
Dept: OCCUPATIONAL THERAPY | Age: 55
Setting detail: THERAPIES SERIES
Discharge: HOME | End: 2020-12-21
Payer: COMMERCIAL

## 2020-12-21 PROCEDURE — 97012 MECHANICAL TRACTION THERAPY: CPT

## 2020-12-21 PROCEDURE — 97530 THERAPEUTIC ACTIVITIES: CPT

## 2020-12-21 PROCEDURE — 97110 THERAPEUTIC EXERCISES: CPT

## 2020-12-21 NOTE — PROGRESS NOTES
Occupational Therapy  Daily Note     Name: Marija Grimes  : 1965  MRN: [id number]  Diagnosis: cervical sprain    Visit Information:   Onset Date: 20  OT Insurance Information: [employer]  Progress Note Counter: 9    Date: 2020  Time:    OT Therapeutic activities 40 minutes for 3 unit(s), CPT 11990  OT Therapeutic exercises 10 minutes for 1 unit(s), CPT 47875  Cervical traction 10 mins for 1 unit. OT Individual Minutes  Time In: 1300  Time Out: 1400  Minutes: 61    Referring Practitioner: Dr. Davonte Dhillon              Subjective:  \"I had my MRI. \"  Pt provided results. Results placed in chart. \"I have to go see an Orthopedic. \"       Pain rating:   Pre-treatment pain:  Pre Treatment Pain Screening  Pain at present: 3 3/10    Action for pain:   Patient reports pain is at acceptable level for treatment. Pain after treatment:      4/10         Focus of treatment was on the following:   decreasing pain     Objective:    Treatment Activity: Pt completed multiple acts to increase UE function and decrease Pain for acts participation/completion Pt completed pulleys while seated x 5 mins. Pt completed shoulder internal/external rotation activity with nerf ball x 10 trials with rest required at 5 trials. Pt completed shoulder abduction/adduction activity with use of physioball on table top x 10 trials each side. Pt provideed with PROM strectch to bilateral upper traps. Modality:     cervical traction   X 10 mins 21632  Exercises:  Pt completed table top ROM activities    for protraction/retraction , elevation/depression , abduction/adduction , horizontal abduction/adduction  and flexion/extension         Education/HEP: body mechanics: general for body positioning and function with fair understanding noted. Discussed previous HEP: no, N/A    Comments:     Assessment:   Pt tolerated treatment well. Pt reported increased  pain after OT treatment.     Plan:   Continue POC    Goals:   Patient participated in ROM and and active there acts to increase UE function, decrease pain and increase ability to participate in functional acts.        Paige Fatima, OTR/L   47/41/7520  2:04 PM Electronically signed by JULIAN Weiner/L on 08/97/23 at 3:29 PM EST

## 2020-12-28 ENCOUNTER — HOSPITAL ENCOUNTER (OUTPATIENT)
Dept: OCCUPATIONAL THERAPY | Age: 55
Setting detail: THERAPIES SERIES
Discharge: HOME | End: 2020-12-28
Payer: COMMERCIAL

## 2020-12-28 PROCEDURE — 97012 MECHANICAL TRACTION THERAPY: CPT

## 2020-12-28 PROCEDURE — 97530 THERAPEUTIC ACTIVITIES: CPT

## 2020-12-28 PROCEDURE — 97140 MANUAL THERAPY 1/> REGIONS: CPT

## 2020-12-28 NOTE — PROGRESS NOTES
Occupational Therapy  Daily Note     Name: Valente Granados  : 1965  MRN: [id number]  Diagnosis: cervical sprain    Visit Information:   Onset Date: 20  OT Insurance Information: [employer]nie [employer]  Total # of Visits Approved: 14  Progress Note Counter: 10    Date: 2020  Time:   OT Manual therapy 37 minutes for 2 unit(s), CPT 75698    1:13-1:50 pm  OT Therapeutic activities 13 minutes for 1 unit(s), CPT 99890   1:50-2:03 pm  OT Mechanical traction 10 minutes for 1 unit, CPT 76948  1:03-1:13 pm     OT Individual Minutes  Time In: 1301  Time Out: 8718  Minutes: 59    Referring Practitioner: Dr. Earl Snider:     \"I think all this reaching and carrying they have me doing at work is making me ache more in my shoulder blades and spine area,\" pt. stated. Pain rating:   Pre-treatment pain:    4/10  bilateral posterior lateral side of neck and medial border of scapulas. Action for pain:   mechanical cervical traction    Pain after treatment:      3/10     Soreness pain. Focus of treatment was on the following:   Improving neck and shoulder mobility. , decreasing fascial restrictions and decreasing pain     Objective:    Treatment Activity:     Modality:     mechanical traction machine x 10 min x 10 lbs. for cervical spine while supine on mat. MFR/Manual:   Pt. tolerated soft tissue mobilization to posterior neck, B trapezius muscles and B medial scapula border while supine on mat incorporating shoulder depression motions for elongation of neck. AAROM B shoulder flexion performed while supine with stretch provided at end range and stabilizing scapula. Pt. performed AROM cervical spine while maintaining upper trunk in good alignment. While continuing supine position, pt. performed reciprocal UE cross body reaching tasks while maintaining lower trunk against mat. Then performed side to side knee rolling with Upper body stable against mat.   Pt. reports, \"I feel stretching with all those motions. \"    Education/HEP: cervical stretches while supine on bed. Discussed previous HEP: yes, Pt verbally confirmed compliant with HEP's    Comments:     Assessment:   Pt tolerated treatment well. Improved soft tissue mobility noted at end of session with decreased pain. Pt. continues to have difficulty with repetitive lifting at work. Pt. demonstrates good understanding and follow through with HEPs. Plan:   Continue POC. Focus on alignment/ posture and strengthening UEs to return to work without restrictions. Goals:     Long term goals  Long term goal 1: Patient will report pain 3-4 or less during functional activities. Long term goal 2: Patient  will be independent with all recommended HEP's, adaptive strategies, and adaptive techniques. Long term goal 3: Patient will report decreased frequency of numbness/tingling in BUEs  Long term goal 4: Patient  will increase BUE shoulder strength from current by 1/2  to increase performance with I/ADL's. Long term goal 5: Patient will decrease fascial restrictions in neck and trapezius muscles to decrease pain during functional activities  Long term goals 6: Patient will be IND with ECWS techniques  Long term goal 7: Pt. will demonstrate good postural set for head and neck during all lifting tasks and standing for improved work tolerance.     Cassie Albert OTR/L   12/28/2020  2:14 PM

## 2020-12-30 ENCOUNTER — HOSPITAL ENCOUNTER (OUTPATIENT)
Dept: OCCUPATIONAL THERAPY | Age: 55
Setting detail: THERAPIES SERIES
Discharge: HOME | End: 2020-12-30
Payer: COMMERCIAL

## 2020-12-30 PROCEDURE — 97012 MECHANICAL TRACTION THERAPY: CPT

## 2020-12-30 PROCEDURE — 97140 MANUAL THERAPY 1/> REGIONS: CPT

## 2020-12-30 PROCEDURE — 97530 THERAPEUTIC ACTIVITIES: CPT

## 2020-12-30 NOTE — PROGRESS NOTES
Occupational Therapy  Daily Note     Name: Tomasa Rodriguez  : 1965  MRN: [id number]  Diagnosis: cervical sprain    Visit Information:   Onset Date: 20  OT Insurance Information: Lotus Goncalves  Total # of Visits Approved: 14  Progress Note Counter: 11    Date: 2020  Time:   OT Mechanical traction 10 minutes for 1 unit, CPT 80828    1:04-1:14 pm  OT Manual therapy 25 minutes for 2 unit(s), CPT 71973   1:14-1:39 pm  OT Therapeutic activities 21 minutes for 1 unit(s), CPT 73928  1:39-2:00 pm        OT Individual Minutes  Time In: 1302  Time Out: 1400  Minutes: 62    Referring Practitioner: Dr. Gerda Devine       Subjective:     \"I am stretching and doing what I should every day, I am better,\" pt. states. Pain rating:   Pre-treatment pain:    3/10  base of neck and between shoulder blades BUEs. Action for pain:   Patient reports pain is at acceptable level for treatment. Pain after treatment:      310 -4/10 same area       Focus of treatment was on the following:   decreasing fascial restrictions and increase bilateral  UE strength for work skills. Objective:    Treatment Activity:     Modality:     mechanical traction at 10 lbs. x 10 minutes cervical spine while supine on mat. MFR/Manual:   Cervical: cervical release for bilateral side bending. Soft tissue massage bilateral posterior neck and trapezius muscle area while supine on mat. Pt. followed with AROM of BUEs while supine using 1 lb. dowel bar for straight arm raises and chest presses while supine, 2 sets x 10. Upper body rotation with UE reaching side to side while LE stable completed with cues to turn head/neck same direction. Pt. was able to complete BUE endurance task on UBE for 2 minutes forward, 1 minute reverse at level 1. No pain complaints noted. Discussed previous HEP: yes, Pt verbally confirmed compliant with HEP's    Comments:     Assessment:   Pt tolerated treatment well.   Pt. progressing well and beginning to perform posture and strengthening tasks without increasing pain. Pain managed at 3-4 level at this time. Pt. reports all numbness and tingling resolved. Goals 1,2,3 and 6 met. Plan:   Continue POC    Goals:     Long term goals  Long term goal 1: Patient will report pain 3-4 or less during functional activities. Long term goal 2: Patient  will be independent with all recommended HEP's, adaptive strategies, and adaptive techniques. Long term goal 3: Patient will report decreased frequency of numbness/tingling in BUEs  Long term goal 4: Patient  will increase BUE shoulder strength from current by 1/2  to increase performance with I/ADL's. Long term goal 5: Patient will decrease fascial restrictions in neck and trapezius muscles to decrease pain during functional activities  Long term goals 6: Patient will be IND with ECWS techniques  Long term goal 7: Pt. will demonstrate good postural set for head and neck during all lifting tasks and standing for improved work tolerance.     JULIAN Padilla/L   12/30/2020  3:16 PM

## 2021-01-04 ENCOUNTER — HOSPITAL ENCOUNTER (OUTPATIENT)
Dept: OCCUPATIONAL THERAPY | Age: 56
Setting detail: THERAPIES SERIES
Discharge: HOME | End: 2021-01-04
Payer: COMMERCIAL

## 2021-01-04 PROCEDURE — 97530 THERAPEUTIC ACTIVITIES: CPT

## 2021-01-04 PROCEDURE — 97140 MANUAL THERAPY 1/> REGIONS: CPT

## 2021-01-04 PROCEDURE — 97012 MECHANICAL TRACTION THERAPY: CPT

## 2021-01-04 NOTE — PROGRESS NOTES
Occupational Therapy  Daily Note     Name: Eleanor Day  : 1965  MRN: [id number]  Diagnosis: cervical sprain    Visit Information:   Onset Date: 20  OT Insurance Information: [employer]  Total # of Visits Approved: 14  Progress Note Counter: 12    Date: 2021  Time:   OT Mechanical traction 10 minutes for 1 unit, CPT 32281  1:00-1:10 pm  OT Manual therapy 13 minutes for 1 unit(s), CPT 60935   1:10-1:23 pm  OT Therapeutic activities 30 minutes for 2 unit(s), CPT 24831   1:23-1:53 pm       OT Individual Minutes  Time In: 1300  Time Out: 1400  Minutes: 61    Referring Practitioner: Dr. Juana Banuelos       Subjective:     \"I am sticking to my restrictions at work and doing what I am suppose to do and it is better,\" pt. states. Pain rating:   Pre-treatment pain:    2/10    Action for pain:   No action necessary. Pain after treatment:      4/10     Pt. reports,\"I feel like we aggravated it a little. \"    Focus of treatment was on the following:   decreasing fascial restrictions and increasing strength of posture and UEs. Objective:    Treatment Activity:     Modality:     Cervical mechanical traction x 10 lb. x 10 minutes while pt. supine on mat. MFR/Manual:   Trigger point releases to bilateral trapezius muscles, scalenes and levators while seated in chair to increase soft tissue mobility. Pt. performed chin tucks with isometric postural alignment techniques to improve head/neck posture. While seated, pt. performed BUE strengthening activities for straight arm raises forward, straight arm raises in abduction, rows and bicep curls. Pt. reports mild aggravation of symptoms medial border of scapulas. Thus performed BUE pull downs using yellow theraband in standing position. Per pt. request, applied moist heat to painful area after session complete and prior to pt. returning to work. Discussed previous HEP: yes, Pt verbally confirmed compliant with HEP's Pt demo understanding. Encouraged pt. to continue to focus on posture with all ADL. Comments:     Assessment:   Pt tolerated treatment fairly well. Mild increase in pain with treatment this date, however relieved with moist heat. Improved postural alignment noted with all activities this date. Plan:   Continue POC    Goals:     Long term goals  Long term goal 1: Patient will report pain 3-4 or less during functional activities. Long term goal 2: Patient  will be independent with all recommended HEP's, adaptive strategies, and adaptive techniques. Long term goal 3: Patient will report decreased frequency of numbness/tingling in BUEs  Long term goal 4: Patient  will increase BUE shoulder strength from current by 1/2  to increase performance with I/ADL's. Long term goal 5: Patient will decrease fascial restrictions in neck and trapezius muscles to decrease pain during functional activities  Long term goals 6: Patient will be IND with ECWS techniques  Long term goal 7: Pt. will demonstrate good postural set for head and neck during all lifting tasks and standing for improved work tolerance.     JULIAN Momin/L   1/4/2021  2:12 PM

## 2021-01-06 ENCOUNTER — HOSPITAL ENCOUNTER (OUTPATIENT)
Dept: OCCUPATIONAL THERAPY | Age: 56
Setting detail: THERAPIES SERIES
Discharge: HOME | End: 2021-01-06
Payer: COMMERCIAL

## 2021-01-06 PROCEDURE — 97530 THERAPEUTIC ACTIVITIES: CPT

## 2021-01-06 PROCEDURE — 97012 MECHANICAL TRACTION THERAPY: CPT

## 2021-01-06 PROCEDURE — 97110 THERAPEUTIC EXERCISES: CPT

## 2021-01-06 NOTE — PROGRESS NOTES
Occupational Therapy  Daily Note     Name: Mare Linn  : 1965  MRN: [id number]  Diagnosis: cervical sprain    Visit Information:   Onset Date: 20  OT Insurance Information: Karla Goncalves  Total # of Visits Approved: 14  Progress Note Counter: 13    Date: 2021  Time:   OT Mechanical traction 10 minutes for 1 unit, CPT 19793   1:08-1:18 PM  OT Therapeutic activities 13 minutes for 1 unit(s), CPT 91103   1:18-1:31 PM  OT Therapeutic exercises 36 minutes for 2 unit(s), CPT 66912   1:31-2:07 PM       OT Individual Minutes  Time In: 1308  Time Out: [address]  Minutes: 61    Referring Practitioner: Dr. Elijah Gonzalez          Subjective:     \"I have pain still if I work with my arms overhead and than items are heavy like a 2 litre,\" pt. stated. Pain rating:   Pre-treatment pain:    2/10    Action for pain:   No action necessary. Pain after treatment:      2/10         Focus of treatment was on the following:   assess for progress toward goals, improving UE function without pain    Objective:    Treatment Activity:     Modality:     cervical mechanical traction x 10 minutes with 10 lbs. In supine, pt. performed cervical alignment tasks while completing shoulder strengthening exercises with 2 lb. dumbbell in each hand. Pt. performed straight arm raises, lateral arm raises and chest presses with good alignment. In standing, pt. instructed in and performed wall push ups in modified fashion to improve postural alignment of upper thoracic spine. Pt. was able to lift 30 lbs. floor to waist height and carry 30 lbs. for 20 ft. using BUEs. Pt. completed BUE pull downs and overhead throws with 20 lbs. on conditioning machines x 10 reps. Pt. reports occasional discomfort medial border of scapulas. Provided pt. with green theraband to upgrade HEP as tolerated.        Education/HEP: wall push up modified HEP     Discussed previous HEP: yes, Pt verbally confirmed compliant with HEP's    Comments:     Assessment: Pt tolerated treatment well. Pt. progressing well. Pt. able to perform all tasks this date without increased pain in neck and upper back. Pt. demonstrates good understanding of HEP. General weakness and fatigue noted in BUEs with over head lifting. Plan:   Continue POC    Goals:     Long term goals  Long term goal 1: Patient will report pain 3-4 or less during functional activities. Long term goal 2: Patient  will be independent with all recommended HEP's, adaptive strategies, and adaptive techniques. Long term goal 3: Patient will report decreased frequency of numbness/tingling in BUEs  Long term goal 4: Patient  will increase BUE shoulder strength from current by 1/2  to increase performance with I/ADL's. Long term goal 5: Patient will decrease fascial restrictions in neck and trapezius muscles to decrease pain during functional activities  Long term goals 6: Patient will be IND with ECWS techniques  Long term goal 7: Pt. will demonstrate good postural set for head and neck during all lifting tasks and standing for improved work tolerance.     Donald Wilkinson OTR/L   1/6/2021  2:47 PM

## 2021-01-11 ENCOUNTER — HOSPITAL ENCOUNTER (OUTPATIENT)
Dept: OCCUPATIONAL THERAPY | Age: 56
Setting detail: THERAPIES SERIES
Discharge: HOME | End: 2021-01-11
Payer: COMMERCIAL

## 2021-01-11 PROCEDURE — 97530 THERAPEUTIC ACTIVITIES: CPT

## 2021-01-11 PROCEDURE — 97012 MECHANICAL TRACTION THERAPY: CPT

## 2021-01-11 PROCEDURE — 97110 THERAPEUTIC EXERCISES: CPT

## 2021-01-15 ENCOUNTER — OFFICE VISIT (OUTPATIENT)
Dept: ORTHOPEDIC SURGERY | Age: 56
End: 2021-01-15
Payer: COMMERCIAL

## 2021-01-15 VITALS
WEIGHT: 115 LBS | OXYGEN SATURATION: 98 % | BODY MASS INDEX: 21.16 KG/M2 | TEMPERATURE: 97.5 F | HEIGHT: 62 IN | HEART RATE: 74 BPM

## 2021-01-15 DIAGNOSIS — M48.02 CERVICAL SPINAL STENOSIS: Primary | ICD-10-CM

## 2021-01-15 PROCEDURE — 99243 OFF/OP CNSLTJ NEW/EST LOW 30: CPT | Performed by: ORTHOPAEDIC SURGERY

## 2021-01-15 ASSESSMENT — ENCOUNTER SYMPTOMS
BACK PAIN: 0
SHORTNESS OF BREATH: 0

## 2021-01-15 NOTE — PROGRESS NOTES
Subjective:      Patient ID: Tammy Manriquez is a 48 y.o. female who presents today for:  Chief Complaint   Patient presents with    New Patient     NP- pt here with c/o of cervical sprain,xray 12/2, pt states a case of batteries fell on her head and she has been having pains in neck and mid back. pt states this happen 4months ago, pt states pain is 3/10. HPI  This 80-year-old woman was referred to our attention from occupational medicine for an injury she sustained to her neck. She was working at [employer],, approximately 4 months ago, when a case of batteries fell and hit the top of her head. There was no loss of consciousness. She the onset of neck pain. She felt like she was somewhat compressed, for lack of a better term. She did have occupational therapy which helped alleviate that symptomatology. She has no arm pain. She does states she gets tingling in her hands at night and this is a new symptom since the injury. However, she has had bilateral carpal tunnel releases. Denies prior neck problems. No balance problems. No loss of bowel or bladder control. She reports weakness in her arms compared to prior to injury. She is not dropping things. No loss of bowel or bladder control. She is exhausted therapy    History reviewed. No pertinent past medical history.   Past Surgical History:   Procedure Laterality Date    CARPAL TUNNEL RELEASE      bilat    DILATION AND CURETTAGE OF UTERUS      TUBAL LIGATION       Social History     Socioeconomic History    Marital status: Single     Spouse name: Not on file    Number of children: Not on file    Years of education: Not on file    Highest education level: Not on file   Occupational History    Not on file   Social Needs    Financial resource strain: Not on file    Food insecurity     Worry: Not on file     Inability: Not on file    Transportation needs     Medical: Not on file     Non-medical: Not on file   Tobacco Use    Smoking status: Current Every Day Smoker     Packs/day: 1.50     Years: 35.00     Pack years: 52.50     Types: Cigarettes    Smokeless tobacco: Never Used   Substance and Sexual Activity    Alcohol use: Yes     Comment: social    Drug use: No    Sexual activity: Yes   Lifestyle    Physical activity     Days per week: Not on file     Minutes per session: Not on file    Stress: Not on file   Relationships    Social connections     Talks on phone: Not on file     Gets together: Not on file     Attends Scientologist service: Not on file     Active member of club or organization: Not on file     Attends meetings of clubs or organizations: Not on file     Relationship status: Not on file    Intimate partner violence     Fear of current or ex partner: Not on file     Emotionally abused: Not on file     Physically abused: Not on file     Forced sexual activity: Not on file   Other Topics Concern    Not on file   Social History Narrative    Not on file     History reviewed. No pertinent family history. No Known Allergies      Review of Systems   Constitutional: Negative for activity change and fatigue. Respiratory: Negative for shortness of breath. Genitourinary: Negative for difficulty urinating, dysuria, hematuria and urgency. Musculoskeletal: Positive for neck pain and neck stiffness. Negative for arthralgias, back pain, gait problem, joint swelling and myalgias. Neurological: Negative for weakness and numbness. Objective:   Pulse 74   Temp 97.5 °F (36.4 °C) (Temporal)   Ht 5' 2\" (1.575 m)   Wt 115 lb (52.2 kg)   SpO2 98%   BMI 21.03 kg/m²     Physical Exam :  Normal tandem gait. Diffuse tenderness mid and lower cervical spine in the midline. Diffuse tenderness at about the T9 spinous process level. She is able to flex to within 2 fingers chin to chest.  Extension of 10 degrees. Left and right sidebending 20 degrees. She had negative Spurling sign. She negative Tinel's sign over the median nerve both wrists. Manual motor testing was 5/5 for deltoid, bicep, tricep, wrist extensors and flexors, finger flexors and interossei. Sensation was intact both arms. She had a positive Nicole response in the right upper extremity negative on the left. No clonus in the lower extremities    Radiographs and Laboratory Studies:     Diagnostic Imaging Studies:    AP, lateral and oblique images of the cervical spine show spondylosis with disc space narrowing C5-6. There is a compensatory subluxation of C3 on 4 and C4 and 5 of approximately 2 mm with flexion and reduces completely in extension. Assessment:       Diagnosis Orders   1. Cervical spinal stenosis     2. Cervical stenosis of spinal canal           Plan:      60-year-old woman who sustained a cervical strain. However the mechanism of this injury can lead to spinal cord contusion given her very slight instability on flexion extension or ligamentous laxity of the cervical spine. She does have a positive Nicole response in the right upper extremity which is a long track sign and is not normal.    I recommended an MRI of the cervical spine to assess for cervical spinal stenosis and/or increased signal intensity within the cervical spinal cord suggesting contusion. No orders of the defined types were placed in this encounter. No orders of the defined types were placed in this encounter. No follow-ups on file.       Constantin Butt MD

## 2021-02-27 ENCOUNTER — HOSPITAL ENCOUNTER (OUTPATIENT)
Dept: MRI IMAGING | Age: 56
Discharge: HOME | End: 2021-03-01
Payer: COMMERCIAL

## 2021-02-27 DIAGNOSIS — M48.02 CERVICAL SPINAL STENOSIS: ICD-10-CM

## 2021-02-27 PROCEDURE — 72141 MRI NECK SPINE W/O DYE: CPT

## 2021-03-05 ENCOUNTER — OFFICE VISIT (OUTPATIENT)
Dept: ORTHOPEDIC SURGERY | Age: 56
End: 2021-03-05
Payer: COMMERCIAL

## 2021-03-05 VITALS
OXYGEN SATURATION: 99 % | HEIGHT: 62 IN | TEMPERATURE: 97.1 F | BODY MASS INDEX: 21.16 KG/M2 | HEART RATE: 73 BPM | WEIGHT: 115 LBS

## 2021-03-05 DIAGNOSIS — M47.812 CERVICAL SPONDYLOSIS WITHOUT MYELOPATHY: ICD-10-CM

## 2021-03-05 DIAGNOSIS — M48.02 CERVICAL SPINAL STENOSIS: ICD-10-CM

## 2021-03-05 DIAGNOSIS — M54.2 NECK PAIN: Primary | ICD-10-CM

## 2021-03-05 PROCEDURE — 99213 OFFICE O/P EST LOW 20 MIN: CPT | Performed by: ORTHOPAEDIC SURGERY

## 2021-03-05 NOTE — PROGRESS NOTES
Subjective:      Patient ID: Kristi Levine is a 55 y.o. female who presents today for:  Chief Complaint   Patient presents with    Follow-up     follow up on her MRI which was done on 02/27/21       HPI  This 78-year-old woman is here today in follow-up for her neck pain. States now close to 6 months that she has been experiencing pain. She worked last night and states that today there is a great deal of pain over the posterior lower part of her midline neck. Reaching overhead with both hands seems to aggravate it. She has no associated arm pain, numbness, tingling. No balance problems. She had MRI which is here to review today. No past medical history on file.   Past Surgical History:   Procedure Laterality Date    CARPAL TUNNEL RELEASE      bilat    DILATION AND CURETTAGE OF UTERUS      TUBAL LIGATION       Social History     Socioeconomic History    Marital status: Single     Spouse name: Not on file    Number of children: Not on file    Years of education: Not on file    Highest education level: Not on file   Occupational History    Not on file   Social Needs    Financial resource strain: Not on file    Food insecurity     Worry: Not on file     Inability: Not on file    Transportation needs     Medical: Not on file     Non-medical: Not on file   Tobacco Use    Smoking status: Current Every Day Smoker     Packs/day: 1.50     Years: 35.00     Pack years: 52.50     Types: Cigarettes    Smokeless tobacco: Never Used   Substance and Sexual Activity    Alcohol use: Yes     Comment: social    Drug use: No    Sexual activity: Yes   Lifestyle    Physical activity     Days per week: Not on file     Minutes per session: Not on file    Stress: Not on file   Relationships    Social connections     Talks on phone: Not on file     Gets together: Not on file     Attends Buddhism service: Not on file     Active member of club or organization: Not on file     Attends meetings of clubs or organizations: Not on file     Relationship status: Not on file    Intimate partner violence     Fear of current or ex partner: Not on file     Emotionally abused: Not on file     Physically abused: Not on file     Forced sexual activity: Not on file   Other Topics Concern    Not on file   Social History Narrative    Not on file     No family history on file. No Known Allergies      Review of Systems  See HPI    Objective:   Pulse 73   Temp 97.1 °F (36.2 °C) (Temporal)   Ht 5' 2\" (1.575 m)   Wt 115 lb (52.2 kg)   SpO2 99%   BMI 21.03 kg/m²     Physical Exam :  None completed today    Radiographs and Laboratory Studies:     Diagnostic Imaging Studies:    MRI of the cervical spine was reviewed. The MRI shows disc degeneration between C5 and 6 with retrolisthesis of C5 on C6. There is no significant central canal stenosis, although the report says the canal diameter is 10 mm, no spinal cord compression and mild foraminal narrowing is noted. Assessment:       Diagnosis Orders   1. Neck pain  Amb External Referral To Pain Clinic         Plan:      Neck pain of 6 months duration which started following a work-related injury. Although her MRI is not normal, I told her that these look like age-related changes at the C5-6 level. There are not changes that typically would result from a compression injury. Is it possible that the injury exacerbated this disc degeneration that she did not even know she had, and the answer would be quite possibly. I do not see any obvious indications for surgery as it would be terribly unpredictable as to whether or not it would get rid of her pain since we do not know whether the pain is coming from this disc degeneration or not    She asked about pain management and so a consultation was made to such. That will need to get approved by occupational medicine.     I am happy to follow-up as her symptoms require    Orders Placed This Encounter   Procedures    Amb External Referral To Pain Clinic     Referral Priority:   Routine     Referral Type:   Consult for Advice and Opinion     Referral Reason:   Specialty Services Required     Referred to Provider:   Melinda Mao MD     Requested Specialty:   Pain Medicine     Number of Visits Requested:   1     No orders of the defined types were placed in this encounter. No follow-ups on file.       Eduardo Cifuentes MD

## 2022-01-25 ENCOUNTER — HOSPITAL ENCOUNTER (OUTPATIENT)
Dept: PHYSICAL THERAPY | Age: 57
Setting detail: THERAPIES SERIES
Discharge: HOME | End: 2022-01-25
Payer: COMMERCIAL

## 2022-01-25 PROCEDURE — 97162 PT EVAL MOD COMPLEX 30 MIN: CPT

## 2022-01-25 PROCEDURE — 97035 APP MDLTY 1+ULTRASOUND EA 15: CPT

## 2022-01-25 ASSESSMENT — PAIN - FUNCTIONAL ASSESSMENT: PAIN_FUNCTIONAL_ASSESSMENT: PREVENTS OR INTERFERES SOME ACTIVE ACTIVITIES AND ADLS

## 2022-01-25 ASSESSMENT — PAIN DESCRIPTION - PAIN TYPE: TYPE: CHRONIC PAIN

## 2022-01-25 ASSESSMENT — PAIN SCALES - GENERAL: PAINLEVEL_OUTOF10: 8

## 2022-01-25 ASSESSMENT — PAIN DESCRIPTION - LOCATION: LOCATION: NECK

## 2022-01-25 ASSESSMENT — PAIN DESCRIPTION - FREQUENCY: FREQUENCY: INTERMITTENT

## 2022-01-25 NOTE — PROGRESS NOTES
[facility]  PHYSICAL THERAPY EVALUATION    Date: 2022  Patient Name: Sonya Rivers       MRN: [id number]   Account: [de-identified]   : 1965  (55 y.o.)   Gender: female   Referring Practitioner: Dr. Renae Gill                 Diagnosis: cervical pain  Treatment Diagnosis: Cervical pain. Problem list:  1. Pain level 8/10 with movement   2. Decreased cervical ROM   3. Decreased cervical and R scapular strength    4. Decreased endurance causing difficulty in completing work tasks   5. Decreased ability to attain neutral posture. 6.  Neck disability 48%  Additional Pertinent Hx: Neck and upper back pain since injury in 2020 when case of batteries fell on patient's head  Past Medical History:  has a past medical history of Cervical spondylosis without myelopathy. Past Surgical History:   has a past surgical history that includes Carpal tunnel release; Dilation and curettage of uterus; and Tubal ligation. Vital Signs  Patient Currently in Pain: Yes   Pain Screening  Patient Currently in Pain: Yes  Pain Assessment  Pain Assessment: 0-10  Pain Level: 8 (8/10 with movement or pushing activities and 3/10 at rest)  Pain Type: Chronic pain  Pain Location: Neck  Pain Radiating Towards: Inconsistently to B upper traps and shoulders  Pain Descriptors:  (Pinching)  Pain Frequency: Intermittent  Functional Pain Assessment: Prevents or interferes some active activities and ADLs   Lives With: Alone  Home Layout: One level  Home Access: Stairs to enter with rails  Entrance Stairs - Number of Steps: 3  ADL Assistance: Independent  Homemaking Assistance: Independent  Ambulation Assistance: Independent  Active : Yes  Occupation: Full time employment  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   Comments: Pt had OT in  with some improvement. Pt is being followed now by Dr. Ravi Ochoa for pain management as well. Objective:    Balance  Posture: Fair (unable to extend thoracic spine into neutral without moderate manual assist, holds head forward)  Strength RLE  Strength RLE: WFL  Strength LLE  Strength LLE: WFL  Strength RUE  Strength RUE: Exception  Comment: R middle trap strength 3+/5, lower trap strength 4-/5  Strength LUE  Strength LUE: WFL  Strength Other  Other: cervical flexion/extension 3/5, rotation in each direction 4-/5. lateral flexion 3/5 to the L and 3-/5 to R  AROM RUE (degrees)  RUE AROM : WFL  AROM LUE (degrees)  LUE AROM : WFL  Spine  Cervical: Flexion 30, Ext 18,  Rotation R 42, L 60, lateral flexion R 12, L 20  Joint Mobility  Spine: Unable to assess due to soft tissue restrictions  Additional Measures  Flexibility: B upper traps exhibit moderate to severe soft tissue restrictions limiting cervical ROM   Exercises:   Exercises  Exercise 1: active cervical flexion/extension within a pain free ROM  Exercise 2: active cervical rotation within a pain free ROM  Modalities:  Modalities  Ultrasound: Continuous U/S at 1.2 W/cm2 to the R upper trap x 8 min  Manual:  Manual therapy  Soft Tissue Mobalization: performed at the R upper trap x 5 min  *Indicates exercise,modality, or manual techniques to be initiated when appropriate  Assessment: Body structures, Functions, Activity limitations: Decreased functional mobility ,Decreased ROM,Decreased strength,Decreased endurance,Increased pain,Decreased posture  Assessment: Pt presents to PT with cervical pain post injection from pain management reporting difficulty with work completion: looking at computer screen and pushing a cart. Pt exhibits postural deficits, chronic pain symptoms, decreased strength and ROM affecting endurance and ability to tolerate 8 hour work shifts.   Pt to benefit from PT for modalities for pain, ROM and strengthening, postural education and improving functional mobility to safely complete work tasks. Prognosis: Good  Discharge Recommendations: Continue to assess pending progress   Decision Making: Medium Complexity  History: med  Exam: med   Neck disability index 48%  Clinical Presentation: med    Plan  Frequency/Duration:  Plan  Times per week: 2-3  Plan weeks: 4-6 weeks  Current Treatment Recommendations: Strengthening,ROM,Functional Mobility Training,Neuromuscular Re-education,Manual Therapy - Soft Tissue Mobilization,Manual Therapy - Joint Manipulation,Home Exercise Program,Modalities,Integrated Dry Needling   Patient Education  New Education Provided: PT Education: Goals;PT Role;Plan of Care;Home Exercise Program  Patient Education: pain free cervical ROM    POST-PAIN     Pain Rating (0-10 pain scale):  7 /10  Location and pain description same as pre-treatment unless indicated. Action: [] NA  [] Call Physician  [x] Perform HEP  [] Meds as prescribed    Evaluation and patient rights have been reviewed and patient agrees with plan of care. Yes  [x]  No  []   Explain:       Chi Fall Risk Assessment  Risk Factor Scale  Score   History of Falls [] Yes  [x] No 25  0    Secondary Diagnosis [] Yes  [x] No 15  0    Ambulatory Aid [] Furniture  [] Crutches/cane/walker  [x] None/bedrest/wheelchair/nurse 30  15  0    IV/Heparin Lock [] Yes  [x] No 20  0    Gait/Transferring [] Impaired  [] Weak  [x] Normal/bedrest/immobile 20  10  0    Mental Status [] Forgets limitations  [x] Oriented to own ability 15  0       Total: 0     Based on the Assessment score: check the appropriate box.   [x]  No intervention needed   Low =   Score of 0-24  []  Use standard prevention interventions Moderate =  Score of 24-44   [] Discuss fall prevention strategies   [] Indicate moderate falls risk on eval  []  Use high risk prevention interventions High = Score of 45 and higher   [] Discuss fall prevention strategies   [] Provide supervision during treatment time    Goals  Short term goals  Time Frame for Short term goals: 7-8 visits  Short term goal 1: Pain level  will decrease to 4/10 with cervical mobility  Short term goal 2: Pt will exhibit increased cervical ROM by > 5 deg in all planes  Short term goal 3: Pt will attain neutral posture without manual cues in order to complete HEP correctly  Short term goal 4: Pt's neck disability index with decrease to less than 25%  Short term goal 5: Soft tissue restrictions at the upper traps will reduce to minimal  Long term goals  Time Frame for Long term goals : 16-18 visits  Long term goal 1: Pain level  will decrease to 2/10 with cervical mobility including neck movement while looking at computer and keyboard.   Long term goal 2: Pt will exhibit increased cervical ROM to be WNL in all planes  Long term goal 3: Pt will exhibit 5/5 cervical strength in all planes and R scapular strength middle and lower traps to be 5/5  Long term goal 4: Pt will push a sled weighing 70 lbs without an increase in cervical pain  Long term goal 5: Pt will be independent with HEP    Treatment Initiated : U/S and STM to the R upper trap    PT Individual Minutes  Time In: 0905  Time Out: 1000  Minutes: 55  Timed Code Treatment Minutes: 13 Minutes  Procedure Minutes:42 mod complexity eval     Modality Time In Time Out Total Time Units    PT Evaluation: Moderate Complexity (13132) [id number] 1   Manual Therapy (51489) 077 1000 5 0   U/S (44715) [phone] 8 1       Electronically signed by Steve Gamino PT on 1/25/22 at 4:28 PM EST

## 2022-01-25 NOTE — PROGRESS NOTES
[address]  PHYSICAL THERAPY PLAN OF CARE   [address] [facility], [address] [place] [address]         Ph: [phone]  Fax: [phone]    [] Certification  [] Recertification []  Plan of Care  [] Progress Note [] Discharge      To:  Dr. Lawrnce Landau      From:  Opal Carroll, PT  Patient: Sydnee Tirado     : 1965  Diagnosis: cervical pain     Date: 2022  Treatment Diagnosis: Cervical pain. Problem list:  1. Pain level 8/10 with movement   2. Decreased cervical ROM   3. Decreased cervical and R scapular strength    4. Decreased endurance causing difficulty in completing work tasks   5. Decreased ability to attain neutral posture. 6.  Neck disability 48%    Plan of Care/Certification Expiration Date: 22  Progress Report Period from:  2022  to 2022    Total # of Visits to Date: 1              OBJECTIVE:   Short Term Goals - Time Frame for Short term goals: 7-8 visits    Goals Current/Discharge status  Met   Short term goal 1: Pain level  will decrease to 4/10 with cervical mobility  8/10 [] yes  [x] no   Short term goal 2: Pt will exhibit increased cervical ROM by > 5 deg in all planes  Spine  Cervical: Flexion 30, Ext 18,  Rotation R 42, L 60, lateral flexion R 12, L 20   [] yes  [x] no   Short term goal 3: Pt will attain neutral posture without manual cues in order to complete HEP correctly  Unable to attain thoracic extension for neutral base for cervical postural alignment [] yes  [x] no   Short term goal 4: Pt's neck disability index with decrease to less than 25%  48% disability [] yes  [x] no   Short term goal 5: Soft tissue restrictions at the upper traps will reduce to minimal Moderate to severe []  yes  [x]  no     Long Term Goals - Time Frame for Long term goals : 16-18 visits  Goals Current/ Discharge status Met   Long term goal 1: Pain level  will decrease to 2/10 with cervical mobility including neck movement while looking at computer and keyboard.  8/10 [] yes  [x] no   Long term goal 2: Pt will exhibit increased cervical ROM to be WNL in all planes ROM as above [] yes  [x] no   Long term goal 3: Pt will exhibit 5/5 cervical strength in all planes and R scapular strength middle and lower traps to be 5/5     Strength Other  Other: cervical flexion/extension 3/5, rotation in each direction 4-/5. lateral flexion 3/5 to the L and 3-/5 to R [] yes  [x] no   Long term goal 4: Pt will push a sled weighing 70 lbs without an increase in cervical pain Unable to push an empty cart without an increase in pain [] yes  [x] no   Long term goal 5: Pt will be independent with HEP Initiated [] yes  [] no      Body structures, Functions, Activity limitations: Decreased functional mobility ,Decreased ROM,Decreased strength,Decreased endurance,Increased pain,Decreased posture  Assessment: Pt presents to PT with cervical pain post injection from pain management reporting difficulty with work completion: looking at computer screen and pushing a cart. Pt exhibits postural deficits, chronic pain symptoms, decreased strength and ROM affecting endurance and ability to tolerate 8 hour work shifts. Pt to benefit from PT for modalities for pain, ROM and strengthening, postural education and improving functional mobility to safely complete work tasks. Prognosis: Good  Discharge Recommendations: Continue to assess pending progress  PT Education: Goals;PT Role;Plan of Care;Home Exercise Program  Patient Education: pain free cervical ROM    PLAN: [x] Evaluate and Treat  Frequency/Duration:  Plan  Times per week: 2-3  Plan weeks: 4-6 weeks  Current Treatment Recommendations: Strengthening,ROM,Functional Mobility Training,Neuromuscular Re-education,Manual Therapy - Soft Tissue Mobilization,Manual Therapy - Joint Manipulation,Home Exercise Program,Modalities,Integrated Dry Needling                     Patient Status:[x] Initiate plan of Care    [] Discharge PT. Recommend pt continue with HEP.      [] Additional visits requested, Please re-certify for additional visits:          Signature: Electronically signed by Kathy Bradford PT on 1/25/22 at 4:32 PM EST      If you have any questions or concerns, please don't hesitate to call. Thank you for your referral.    I have reviewed this plan of care and certify a need for medically necessary rehabilitation services.     Physician Signature:__________________________________________________________  Date:  Please sign and return

## 2022-01-27 ENCOUNTER — HOSPITAL ENCOUNTER (OUTPATIENT)
Dept: PHYSICAL THERAPY | Age: 57
Setting detail: THERAPIES SERIES
Discharge: HOME | End: 2022-01-27
Payer: COMMERCIAL

## 2022-01-27 PROCEDURE — 97140 MANUAL THERAPY 1/> REGIONS: CPT

## 2022-01-27 PROCEDURE — G0283 ELEC STIM OTHER THAN WOUND: HCPCS

## 2022-01-27 PROCEDURE — 97110 THERAPEUTIC EXERCISES: CPT

## 2022-01-27 ASSESSMENT — PAIN DESCRIPTION - DESCRIPTORS: DESCRIPTORS: TIGHTNESS

## 2022-01-27 ASSESSMENT — PAIN DESCRIPTION - LOCATION: LOCATION: NECK

## 2022-01-27 ASSESSMENT — PAIN SCALES - GENERAL: PAINLEVEL_OUTOF10: 3

## 2022-01-27 ASSESSMENT — PAIN DESCRIPTION - PAIN TYPE: TYPE: ACUTE PAIN

## 2022-01-27 NOTE — PROGRESS NOTES
[address] [facility]  Outpatient Physical Therapy    Treatment Note        Date: 2022  Patient: Jarvis Freeman  : 1965  ACCT #: [de-identified]  Referring Practitioner: Dr. Cristela Bautista  Diagnosis: cervical pain  Treatment Diagnosis: Cervical pain. Problem list:  1. Pain level 8/10 with movement   2. Decreased cervical ROM   3. Decreased cervical and R scapular strength    4. Decreased endurance causing difficulty in completing work tasks   5. Decreased ability to attain neutral posture. 6.  Neck disability 48%     Visit Information:  PT Visit Information  Onset Date: 22  PT Insurance Information: [id number]  Total # of Visits Approved: 18  Total # of Visits to Date: 2  Plan of Care/Certification Expiration Date: 22  No Show: 0  Progress Note Due Date: 22  Canceled Appointment: 0  Progress Note Counter:     Subjective: Pt reports she was in a lot of pain when she got out of work yesterday.  Pt reported her pain was 8/10     HEP Compliance:  [x] Good [] Fair [] Poor [] Reports not doing due to:    Vital Signs  Patient Currently in Pain: Yes   Pain Screening  Patient Currently in Pain: Yes  Pain Assessment  Pain Assessment: 0-10  Pain Level: 3  Pain Type: Acute pain  Pain Location: Neck  Pain Descriptors: Tightness    OBJECTIVE:   Exercises  Exercise 1: active cervical flexion/extension within a pain free ROM  Exercise 2: active cervical rotation within a pain free ROM  Exercise 3: active reach depressing each shoulder blade x 10 B in supine or unilateral in sidelying  Exercise 4: sitting scapular retraction B (without compensation of trap elevation)  Strength: [x] NT  [] MMT completed:  ROM: [x] NT  [] ROM measurements:  Manual:   Manual therapy  Joint mobilization: R scapular mobilization in all planes emphasizing depression and retraction  Manual traction: cervical manual traction intermittent with 30 sec holds  Soft Tissue Mobalization: performed at the B upper [facility], occipitals, and SCM  Other: Prone middle an lower trap strengthening with manual cues at scapulae for neuro re-ed total manual 30  Modalities:  Modalities  Moist heat: provided to the cervical region pre ex x 10 min  E-stim (parameters): IFC to the left upper trap region x 10 min   *Indicates exercise, modality, or manual techniques to be initiated when appropriate  Assessment: Body structures, Functions, Activity limitations: Decreased functional mobility ,Decreased ROM,Decreased strength,Decreased endurance,Increased pain,Decreased posture  Assessment: Pt continues to exhibit moderate to severe soft tissue restrictions at the occipitals, SCMs, and upper traps preventing normal movement at the neck and scapulae. Emphasis of treatment on soft tissue restriction reduction and muscle re-ed to facilitate normal movement in these areas  Treatment Diagnosis: Cervical pain. Problem list:  1. Pain level 8/10 with movement   2. Decreased cervical ROM   3. Decreased cervical and R scapular strength    4. Decreased endurance causing difficulty in completing work tasks   5. Decreased ability to attain neutral posture. 6.  Neck disability 48%  Prognosis: Good     Goals:  Short term goals  Time Frame for Short term goals: 7-8 visits  Short term goal 1: Pain level  will decrease to 4/10 with cervical mobility  Short term goal 2: Pt will exhibit increased cervical ROM by > 5 deg in all planes  Short term goal 3: Pt will attain neutral posture without manual cues in order to complete HEP correctly  Short term goal 4: Pt's neck disability index with decrease to less than 25%  Short term goal 5: Soft tissue restrictions at the upper traps will reduce to minimal    Long term goals  Time Frame for Long term goals : 16-18 visits  Long term goal 1: Pain level  will decrease to 2/10 with cervical mobility including neck movement while looking at computer and keyboard.   Long term goal 2: Pt will exhibit increased cervical ROM to be WNL in all planes  Long term goal 3: Pt will exhibit 5/5 cervical strength in all planes and R scapular strength middle and lower traps to be 5/5  Long term goal 4: Pt will push a sled weighing 70 lbs without an increase in cervical pain  Long term goal 5: Pt will be independent with HEP  Progress toward goals:reduction of soft tissue restrictions    POST-PAIN       Pain Rating (0-10 pain scale):  3 /10   Location and pain description same as pre-treatment unless indicated. Action: [] NA   [x] Perform HEP  [] Meds as prescribed  [x] Modalities as prescribed   [] Call Physician     Frequency/Duration:  Plan  Times per week: 2-3  Plan weeks: 4-6 weeks  Current Treatment Recommendations: Strengthening,ROM,Functional Mobility Training,Neuromuscular Re-education,Manual Therapy - Soft Tissue Mobilization,Manual Therapy - Joint Manipulation,Home Exercise Program,Modalities,Integrated Dry Needling     Pt to continue current HEP. See objective section for any therapeutic exercise changes, additions or modifications this date.     PT Individual Minutes  Time In: 8647  Time Out: 0902  Minutes: 67  Timed Code Treatment Minutes: 47 Minutes  Procedure Minutes:10 min moist heat 755 to 805, 10 minutes IFC    Modality Time In Time Out Total Minutes Units    Ther ex ([id number]) 086 852 17 1   Manual Therapy ([id number]) 803 835 30 2   Estim unattended   ([id number]) 032 102 10 1     Signature:  Electronically signed by Chelo Antonio PT on 1/27/22 at 10:08 AM EST

## 2022-01-31 ENCOUNTER — HOSPITAL ENCOUNTER (OUTPATIENT)
Dept: PHYSICAL THERAPY | Age: 57
Setting detail: THERAPIES SERIES
Discharge: HOME | End: 2022-01-31
Payer: COMMERCIAL

## 2022-01-31 PROCEDURE — 97140 MANUAL THERAPY 1/> REGIONS: CPT

## 2022-01-31 PROCEDURE — G0283 ELEC STIM OTHER THAN WOUND: HCPCS

## 2022-01-31 PROCEDURE — 97012 MECHANICAL TRACTION THERAPY: CPT

## 2022-01-31 PROCEDURE — 97110 THERAPEUTIC EXERCISES: CPT

## 2022-01-31 ASSESSMENT — PAIN DESCRIPTION - PAIN TYPE: TYPE: ACUTE PAIN

## 2022-01-31 ASSESSMENT — PAIN DESCRIPTION - DESCRIPTORS: DESCRIPTORS: TIGHTNESS;PRESSURE

## 2022-01-31 ASSESSMENT — PAIN DESCRIPTION - FREQUENCY: FREQUENCY: INTERMITTENT

## 2022-01-31 ASSESSMENT — PAIN DESCRIPTION - LOCATION: LOCATION: NECK

## 2022-01-31 ASSESSMENT — PAIN SCALES - GENERAL: PAINLEVEL_OUTOF10: 2

## 2022-01-31 ASSESSMENT — PAIN DESCRIPTION - ORIENTATION: ORIENTATION: RIGHT;LEFT

## 2022-01-31 NOTE — PROGRESS NOTES
[address] [facility]  Outpatient Physical Therapy    Treatment Note        Date: 2022  Patient: Homero Rizo  : 1965  ACCT #: [de-identified]  Referring Practitioner: Dr. John Quintanilla  Diagnosis: cervical pain  Treatment Diagnosis: Cervical pain. Problem list:  1. Pain level 8/10 with movement   2. Decreased cervical ROM   3. Decreased cervical and R scapular strength    4. Decreased endurance causing difficulty in completing work tasks   5. Decreased ability to attain neutral posture. 6.  Neck disability 48%     Visit Information:  PT Visit Information  Onset Date: 22  PT Insurance Information: [facility]  Total # of Visits Approved: 18  Total # of Visits to Date: 3  Plan of Care/Certification Expiration Date: 22  No Show: 0  Canceled Appointment: 0  Progress Note Counter: 3/18    Subjective: Pt reports she is actively moving her neck as much as possible at work.  Pain levels are decreasing     HEP Compliance:  [] Good [x] Fair [] Poor [] Reports not doing due to:    Vital Signs  Patient Currently in Pain: Yes   Pain Screening  Patient Currently in Pain: Yes  Pain Assessment  Pain Assessment: 0-10  Pain Level: 2  Pain Type: Acute pain  Pain Location: Neck  Pain Orientation: Right;Left  Pain Descriptors: Tightness;Pressure  Pain Frequency: Intermittent    OBJECTIVE:   Exercises  Exercise 1: active cervical flexion/extension within a pain free ROM  Exercise 2: active cervical lateral flexion within a pain free ROM  Exercise 3: active reach depressing each shoulder blade x 10 B in supine or unilateral in sidelying  Exercise 4: sitting scapular retraction B (without compensation of trap elevation)  Exercise 5: sidelying forward reach and retraction with 1 lb  Exercise 6: prone middle trap x 10  Exercise 7: prone lower trap x 10  Strength: [x] NT  [] MMT completed:  ROM: [x] NT  [] ROM measurements:  Manual:   Manual therapy  Joint mobilization: scapular mobilization in all planes emphasizing depression and retraction each side  Soft Tissue Mobalization: performed at the B upper traps, occipitals, and SCM in sit and in supine  Other: total manual 25  Modalities:  Modalities  E-stim (parameters): IFC to the B cervical/upper traps region x 10 min  Cervical traction: intermittent cervical traction 50% speed 11 lbs max/2 lbs min x 10 min   *Indicates exercise, modality, or manual techniques to be initiated when appropriate  Assessment: Body structures, Functions, Activity limitations: Decreased functional mobility ,Decreased ROM,Decreased strength,Decreased endurance,Increased pain,Decreased posture  Assessment: Pt reporting she is having more consistent performance of active cervical movement and that is helping to decrease tightness/stiffness. Initiated cervical intermittent traction to assist with improving active mobility of the cervical spine. Exercise completion requires min tactile cues for correct performance  Treatment Diagnosis: Cervical pain. Problem list:  1. Pain level 8/10 with movement   2. Decreased cervical ROM   3. Decreased cervical and R scapular strength    4. Decreased endurance causing difficulty in completing work tasks   5. Decreased ability to attain neutral posture.   6.  Neck disability 48%  Prognosis: Good  Goals:  Short term goals  Time Frame for Short term goals: 7-8 visits  Short term goal 1: Pain level  will decrease to 4/10 with cervical mobility  Short term goal 2: Pt will exhibit increased cervical ROM by > 5 deg in all planes  Short term goal 3: Pt will attain neutral posture without manual cues in order to complete HEP correctly  Short term goal 4: Pt's neck disability index with decrease to less than 25%  Short term goal 5: Soft tissue restrictions at the upper traps will reduce to minimal    Long term goals  Time Frame for Long term goals : 16-18 visits  Long term goal 1: Pain level  will decrease to 2/10 with cervical mobility including neck movement while looking at computer and keyboard. Long term goal 2: Pt will exhibit increased cervical ROM to be WNL in all planes  Long term goal 3: Pt will exhibit 5/5 cervical strength in all planes and R scapular strength middle and lower traps to be 5/5  Long term goal 4: Pt will push a sled weighing 70 lbs without an increase in cervical pain  Long term goal 5: Pt will be independent with HEP  Progress toward goals: reducing soft tissue restrictions, facilitate pain free active movement    POST-PAIN       Pain Rating (0-10 pain scale):  4 /10   Location and pain description same as pre-treatment unless indicated. Action: [] NA   [x] Perform HEP  [] Meds as prescribed  [x] Modalities as prescribed   [] Call Physician     Frequency/Duration:  Plan  Times per week: 2-3  Plan weeks: 4-6 weeks  Current Treatment Recommendations: Strengthening,ROM,Functional Mobility Training,Neuromuscular Re-education,Manual Therapy - Soft Tissue Mobilization,Manual Therapy - Joint Manipulation,Home Exercise Program,Modalities,Integrated Dry Needling     Pt to continue current HEP. See objective section for any therapeutic exercise changes, additions or modifications this date.     PT Individual Minutes  Time In: 3104  Time Out: 1000  Minutes: 65  Timed Code Treatment Minutes: 45 Minutes  Procedure Minutes:10 minutes mechanical cervical traction, 10 minutes E. stim to cervical region     Modality Time In Time Out Total Minutes Units    Ther ex ([id number]) 930 950 20 1   Manual Therapy ([id number]) 855 920 25 2   Mechanical traction (67162) 920 930 10             1   Estim unattended   ([id number]) 950 1000 10             1     Signature:  Electronically signed by Betty Joseph PT on 1/31/22 at 11:31 AM EST

## 2022-02-02 ENCOUNTER — HOSPITAL ENCOUNTER (OUTPATIENT)
Dept: PHYSICAL THERAPY | Age: 57
Setting detail: THERAPIES SERIES
Discharge: HOME | End: 2022-02-02
Payer: COMMERCIAL

## 2022-02-02 PROCEDURE — 97012 MECHANICAL TRACTION THERAPY: CPT

## 2022-02-02 PROCEDURE — 97140 MANUAL THERAPY 1/> REGIONS: CPT

## 2022-02-02 PROCEDURE — 97110 THERAPEUTIC EXERCISES: CPT

## 2022-02-02 ASSESSMENT — PAIN DESCRIPTION - LOCATION: LOCATION: HEAD

## 2022-02-02 ASSESSMENT — PAIN SCALES - GENERAL: PAINLEVEL_OUTOF10: 2

## 2022-02-02 ASSESSMENT — PAIN DESCRIPTION - FREQUENCY: FREQUENCY: INTERMITTENT

## 2022-02-02 ASSESSMENT — PAIN DESCRIPTION - DESCRIPTORS: DESCRIPTORS: PRESSURE

## 2022-02-02 NOTE — PROGRESS NOTES
[address] [facility]  Outpatient Physical Therapy    Treatment Note        Date: 2022  Patient: Priscila Stubbs  : 1965  ACCT #: [de-identified]  Referring Practitioner: Dr. Marika Ortiz  Diagnosis: cervical pain  Treatment Diagnosis: Cervical pain. Problem list:  1. Pain level 8/10 with movement   2. Decreased cervical ROM   3. Decreased cervical and R scapular strength    4. Decreased endurance causing difficulty in completing work tasks   5. Decreased ability to attain neutral posture.   6.  Neck disability 48%     Visit Information:  PT Visit Information  Onset Date: 22  PT Insurance Information: [facility]  Total # of Visits Approved: 18  Total # of Visits to Date: 4  Plan of Care/Certification Expiration Date: 22  Progress Note Due Date: 22  Progress Note Counter:   Subjective: Pt also reporting pain towards the mid/lower back on R   HEP Compliance:  [x] Good [] Fair [] Poor [] Reports not doing due to:    Vital Signs  Patient Currently in Pain: Yes   Pain Screening  Patient Currently in Pain: Yes  Pain Assessment  Pain Assessment: 0-10  Pain Level: 2  Pain Location: Head  Pain Descriptors: Pressure  Pain Frequency: Intermittent    OBJECTIVE:   Exercises  Exercise 1: active cervical flexion/extension within a pain free ROM  Exercise 2: active cervical lateral flexion within a pain free ROM progress to cervical sidebend stretch 4 x 20 sec each  Exercise 3: active reach depressing each shoulder blade x 10 B in supine or unilateral in sidelying  Exercise 4: sitting scapular retraction B (without compensation of trap elevation)  Exercise 6: prone middle trap x 10  Exercise 7: prone lower trap x 10  Strength: [] NT  [x] MMT completed:  Strength REX  Comment: R middle trap strength 3+/5, lower trap strength 4-/5   ROM: [] NT  [x] ROM measurements:  Spine  Cervical: Flexion 33, Ext 30,  Rotation R 50, L 60, lateral flexion R 18, L 26  Manual:   Manual therapy  Soft Tissue Mobalization: performed at the B upper traps, occipitals, and scapulothoracic region while in prone  Other: total manual 15  Modalities:  Modalities  Cervical traction: intermittent cervical traction 50% speed 8 lbs max/2 lbs min x 10 min   *Indicates exercise, modality, or manual techniques to be initiated when appropriate  Assessment: Body structures, Functions, Activity limitations: Decreased functional mobility ,Decreased ROM,Decreased strength,Decreased endurance,Increased pain,Decreased posture  Assessment: Cervical ROM improving in all planes, however soft tissue restrictions still present and radiating down towards scapulothoracic region. Treatment Diagnosis: Cervical pain. Problem list:  1. Pain level 8/10 with movement   2. Decreased cervical ROM   3. Decreased cervical and R scapular strength    4. Decreased endurance causing difficulty in completing work tasks   5. Decreased ability to attain neutral posture. 6.  Neck disability 48%  Prognosis: Good  PT Education: Home Exercise Program  Patient Education: cervical sidebeding with overstretch  Goals:  Short term goals  Time Frame for Short term goals: 7-8 visits  Short term goal 1: Pain level  will decrease to 4/10 with cervical mobility (met)  Short term goal 2: Pt will exhibit increased cervical ROM by > 5 deg in all planes  Short term goal 3: Pt will attain neutral posture without manual cues in order to complete HEP correctly  Short term goal 4: Pt's neck disability index with decrease to less than 25%  Short term goal 5: Soft tissue restrictions at the upper traps will reduce to minimal    Long term goals  Time Frame for Long term goals : 16-18 visits  Long term goal 1: Pain level  will decrease to 2/10 with cervical mobility including neck movement while looking at computer and keyboard.   Long term goal 2: Pt will exhibit increased cervical ROM to be WNL in all planes  Long term goal 3: Pt will exhibit 5/5 cervical strength in all planes and R scapular strength middle and lower traps to be 5/5  Long term goal 4: Pt will push a sled weighing 70 lbs without an increase in cervical pain  Long term goal 5: Pt will be independent with HEP  Progress toward goals:reduction of soft tissue restrictions  POST-PAIN       Pain Rating (0-10 pain scale):  3 /10   Location and pain description same as pre-treatment unless indicated. Action: [] NA   [x] Perform HEP  [] Meds as prescribed  [x] Modalities as prescribed   [] Call Physician   Frequency/Duration:  Plan  Times per week: 2-3  Plan weeks: 4-6 weeks  Current Treatment Recommendations: Strengthening,ROM,Functional Mobility Training,Neuromuscular Re-education,Manual Therapy - Soft Tissue Mobilization,Manual Therapy - Joint Manipulation,Home Exercise Program,Modalities,Integrated Dry Needling   Pt to continue current HEP. See objective section for any therapeutic exercise changes, additions or modifications this date.     PT Individual Minutes  Time In: 1009  Time Out: 1059  Minutes: 50  Timed Code Treatment Minutes: 40 Minutes  Procedure Minutes:10 min cervical mechanical traction     Modality Time In Time Out Total Minutes Units    Ther ex ([id number]) 1034 1059 25 2   Manual Therapy ([id number]) 1019 1034 15 1   Mech traction ([id number]) 1009 1019 10 1     Signature:  Electronically signed by Abhijeet Linares PT on 2/2/22 at 11:25 AM EST

## 2022-02-04 ENCOUNTER — HOSPITAL ENCOUNTER (OUTPATIENT)
Dept: PHYSICAL THERAPY | Age: 57
Setting detail: THERAPIES SERIES
Discharge: HOME | End: 2022-02-04
Payer: COMMERCIAL

## 2022-02-04 NOTE — PROGRESS NOTES
Therapy                            Cancellation/No-show Note      Date:  2022  Patient Name:  Jarvis Freeman  :  1965   MRN:  [id number]          Visit Information: For today's appointment patient:  [x]  Cancelled  []  Rescheduled appointment  []  No-show   []  Called pt to remind of next appointment     Reason given by patient:  []  Patient ill  []  Conflicting appointment  []  No transportation    []  Conflict with work  []  No reason given  [x]  Other:  weather  [] Pt has future appointments scheduled, no follow up needed  [] Pt requests to be on hold.     Reason:   If > 2 weeks please discuss with therapist.  [] Therapist to call pt for follow up     Comments:       Signature: Electronically signed by Chelo Antonio PT on 22 at 10:06 AM EST

## 2022-02-07 ENCOUNTER — HOSPITAL ENCOUNTER (OUTPATIENT)
Dept: PHYSICAL THERAPY | Age: 57
Setting detail: THERAPIES SERIES
Discharge: HOME | End: 2022-02-07
Payer: COMMERCIAL

## 2022-02-07 PROCEDURE — 97140 MANUAL THERAPY 1/> REGIONS: CPT

## 2022-02-07 PROCEDURE — 97110 THERAPEUTIC EXERCISES: CPT

## 2022-02-07 ASSESSMENT — PAIN DESCRIPTION - DESCRIPTORS: DESCRIPTORS: ACHING

## 2022-02-07 ASSESSMENT — PAIN DESCRIPTION - PAIN TYPE: TYPE: ACUTE PAIN

## 2022-02-07 ASSESSMENT — PAIN DESCRIPTION - LOCATION: LOCATION: NECK

## 2022-02-07 ASSESSMENT — PAIN DESCRIPTION - ORIENTATION: ORIENTATION: LEFT

## 2022-02-07 ASSESSMENT — PAIN SCALES - GENERAL: PAINLEVEL_OUTOF10: 2

## 2022-02-07 ASSESSMENT — PAIN DESCRIPTION - FREQUENCY: FREQUENCY: INTERMITTENT

## 2022-02-07 NOTE — PROGRESS NOTES
[address] [facility]  Outpatient Physical Therapy    Treatment Note        Date: 2022  Patient: Alpesh Todd  : 1965  ACCT #: [de-identified]  Referring Practitioner: Dr. Carla Groves  Diagnosis: cervical pain  Treatment Diagnosis: Cervical pain. Problem list:  1. Pain level 8/10 with movement   2. Decreased cervical ROM   3. Decreased cervical and R scapular strength    4. Decreased endurance causing difficulty in completing work tasks   5. Decreased ability to attain neutral posture.   6.  Neck disability 48%     Visit Information:  PT Visit Information  Onset Date: 22  PT Insurance Information: [id number]  Total # of Visits Approved: 18  Total # of Visits to Date: 5  Plan of Care/Certification Expiration Date: 22  Progress Note Due Date: 22  Progress Note Counter:     Subjective: Pt reporting she had increased pain for 3 days after last treatment that was in the middle to low part of the R side of her back   HEP Compliance:  [x] Good [] Fair [] Poor [] Reports not doing due to:  Vital Signs  Patient Currently in Pain: Yes   Pain Screening  Patient Currently in Pain: Yes  Pain Assessment  Pain Assessment: 0-10  Pain Level: 2  Pain Type: Acute pain  Pain Location: Neck  Pain Orientation: Left  Pain Descriptors: Aching  Pain Frequency: Intermittent  OBJECTIVE:   Exercises  Exercise 1: active cervical flexion/extension within a pain free ROM  Exercise 2: active cervical lateral flexion within a pain free ROM progress to cervical sidebend stretch 4 x 20 sec each  Exercise 3: active reach depressing each shoulder blade x 10 B in supine or unilateral in sidelying  Exercise 5: sidelying forward reach and retraction with 1 lb  Exercise 6: prone middle trap x 10 B  Exercise 7: prone lower trap x 10 B  Exercise 8: supine wand (2lbs) flexion emphasizing downward scapular depression when returning to neutral  Exercise 9: standing scapular retraction with red theraband resistance x 10  Exercise 10: standing scapular pull downs with elbows bent with red theraband resistance x 10  Strength: [x] NT  [] MMT completed:   ROM: [] NT  [x] ROM measurements:   Spine  Cervical: Flexion 33 with pt's breathing affected, Ext 35,  Rotation R 50, L 60, lateral flexion R 18, L 26  Manual:   Manual therapy  Joint mobilization: scapular mobilization in all planes emphasizing depression and retraction each side  Manual traction: cervical manual traction intermittent with 30 sec holds  Soft Tissue Mobalization: performed at the B upper traps, occipitals, and B scapulothoracic region while in prone  Other: total manual 25  Modalities:  Modalities  Moist heat: provided to the R cervical/scapular region post ex x 10 min   *Indicates exercise, modality, or manual techniques to be initiated when appropriate  Assessment:   Activity Tolerance  Activity Tolerance: Patient limited by endurance (Limited by soft tissue restrictions)  Body structures, Functions, Activity limitations: Decreased functional mobility ,Decreased ROM,Decreased strength,Decreased endurance,Increased pain,Decreased posture  Assessment: Pt reporting that active or passive cervical flexion often limits her ability to breathe as her cervical vertebrae push forward causing breathing to be temporarily reduced. L upper trap less restricted today. Deferred mechanical cervical traction as pt reported increased soreness in her R low back  post treatment. Will assess efficacy of mechanical traction with continued treatment  Treatment Diagnosis: Cervical pain. Problem list:  1. Pain level 8/10 with movement   2. Decreased cervical ROM   3. Decreased cervical and R scapular strength    4. Decreased endurance causing difficulty in completing work tasks   5. Decreased ability to attain neutral posture.   6.  Neck disability 48%  Prognosis: Good  Goals:  Short term goals  Time Frame for Short term goals: 7-8 visits  Short term goal 1: Pain level  will decrease to 4/10 with cervical mobility (met)  Short term goal 2: Pt will exhibit increased cervical ROM by > 5 deg in all planes (met except flexion due to breathing compromise)  Short term goal 3: Pt will attain neutral posture without manual cues in order to complete HEP correctly  Short term goal 4: Pt's neck disability index with decrease to less than 25%  Short term goal 5: Soft tissue restrictions at the upper traps will reduce to minimal  Long term goals  Time Frame for Long term goals : 16-18 visits  Long term goal 1: Pain level  will decrease to 2/10 with cervical mobility including neck movement while looking at computer and keyboard. Long term goal 2: Pt will exhibit increased cervical ROM to be WNL in all planes  Long term goal 3: Pt will exhibit 5/5 cervical strength in all planes and R scapular strength middle and lower traps to be 5/5  Long term goal 4: Pt will push a sled weighing 70 lbs without an increase in cervical pain  Long term goal 5: Pt will be independent with HEP  Progress toward goals: reduction of soft tissue restrictions, strengthening  POST-PAIN       Pain Rating (0-10 pain scale):   3-4/10   Location and pain description same as pre-treatment unless indicated. Action: [] NA   [x] Perform HEP  [] Meds as prescribed  [x] Modalities as prescribed   [] Call Physician   Frequency/Duration:  Plan  Times per week: 2-3  Plan weeks: 4-6 weeks  Current Treatment Recommendations: Strengthening,ROM,Functional Mobility Training,Neuromuscular Re-education,Manual Therapy - Soft Tissue Mobilization,Manual Therapy - Joint Manipulation,Home Exercise Program,Modalities,Integrated Dry Needling  Pt to continue current HEP. See objective section for any therapeutic exercise changes, additions or modifications this date.   PT Individual Minutes  Time In: 1005  Time Out: 1110  Minutes: 65  Timed Code Treatment Minutes: 55 Minutes  Procedure Minutes:10 min moist heat 1100 to 1110     Modality Time In Time Out Total Minutes Units    Ther ex [id number]) 3159 7550 25 2   Manual Therapy ([id number]) 0352 4244 30 2     Signature:  Electronically signed by Opal Carroll PT on 2/7/22 at 11:15 AM EST

## 2022-02-09 ENCOUNTER — HOSPITAL ENCOUNTER (OUTPATIENT)
Dept: PHYSICAL THERAPY | Age: 57
Setting detail: THERAPIES SERIES
Discharge: HOME | End: 2022-02-09
Payer: COMMERCIAL

## 2022-02-09 PROCEDURE — 97140 MANUAL THERAPY 1/> REGIONS: CPT

## 2022-02-09 PROCEDURE — 97110 THERAPEUTIC EXERCISES: CPT

## 2022-02-09 ASSESSMENT — PAIN SCALES - GENERAL: PAINLEVEL_OUTOF10: 2

## 2022-02-09 ASSESSMENT — PAIN DESCRIPTION - DESCRIPTORS: DESCRIPTORS: ACHING

## 2022-02-09 ASSESSMENT — PAIN DESCRIPTION - PAIN TYPE: TYPE: ACUTE PAIN

## 2022-02-09 ASSESSMENT — PAIN DESCRIPTION - LOCATION: LOCATION: NECK

## 2022-02-09 ASSESSMENT — PAIN DESCRIPTION - ORIENTATION: ORIENTATION: LEFT

## 2022-02-09 NOTE — PROGRESS NOTES
[address] [facility]  Outpatient Physical Therapy    Treatment Note        Date: 2022  Patient: Esther Whaley  : 1965  ACCT #: [de-identified]  Referring Practitioner: Dr. Stephen Dillard  Diagnosis: cervical pain  Treatment Diagnosis: Cervical pain. Problem list:  1. Pain level 8/10 with movement   2. Decreased cervical ROM   3. Decreased cervical and R scapular strength    4. Decreased endurance causing difficulty in completing work tasks   5. Decreased ability to attain neutral posture.   6.  Neck disability 48%   Visit Information:  PT Visit Information  Onset Date: 22  PT Insurance Information: [facility]  Total # of Visits Approved: 18  Total # of Visits to Date: 6  Plan of Care/Certification Expiration Date: 22  Progress Note Counter:   Subjective: Pt reporting pain centered at middle of neck   HEP Compliance:  [x] Good [] Fair [] Poor [] Reports not doing due to:  Vital Signs  Patient Currently in Pain: Yes   Pain Screening  Patient Currently in Pain: Yes  Pain Assessment  Pain Assessment: 0-10  Pain Level: 2  Pain Type: Acute pain  Pain Location: Neck  Pain Orientation: Left  Pain Descriptors: Aching  OBJECTIVE:   Exercises  Exercise 1: active cervical flexion/extension within a pain free ROM  Exercise 2: active cervical lateral flexion within a pain free ROM progress to cervical sidebend stretch 4 x 20 sec each  Exercise 5: unilateral pull backs with red theraband each side x 10  Exercise 6: prone middle trap x 10 B  Exercise 7: prone lower trap x 10 B  Exercise 8: supine wand (2lbs) B shoulder flexion within a pain free ROM  Exercise 9: supine with wand reaching downward (scapular depression) x 10  Exercise 10: standing scapular retraction with red theraband resistance x 10  Exercise 11: standing scapular pull downs with elbows bent with red theraband resistance x 10  Balance  Posture: Fair (mild jutting forward of head due to pt avoiding true cervical flexion as it affects her breathing)  Strength: [] NT  [x] MMT completed:   Strength Other  Other: cervical flexion 4-/5 extension 4/5, rotation in each direction 4/5. lateral flexion 3/5 to the L and 4/5 to R  ROM: [x] NT  [] ROM measurements:  Manual:   Manual therapy  Manual traction: cervical manual traction intermittent with 30 sec holds  Soft Tissue Mobalization: performed at the B upper traps, occipitals, and B scapulothoracic region while in prone  Other: total manual 25 min  Modalities:  Modalities  Moist heat: provided to the R cervical/scapular region post ex x 10 min   *Indicates exercise, modality, or manual techniques to be initiated when appropriate  Assessment: Body structures, Functions, Activity limitations: Decreased functional mobility ,Decreased ROM,Decreased strength,Decreased endurance,Increased pain,Decreased posture  Assessment: Severe soft tissue restrictions at cervical paraspinals reducing well this date with manual techniques, including manual traction. Progressing cervical stability exerices without an increase in pain  Treatment Diagnosis: Cervical pain. Problem list:  1. Pain level 8/10 with movement   2. Decreased cervical ROM   3. Decreased cervical and R scapular strength    4. Decreased endurance causing difficulty in completing work tasks   5. Decreased ability to attain neutral posture.   6.  Neck disability 48%  Prognosis: Good  Goals:  Short term goals  Time Frame for Short term goals: 7-8 visits  Short term goal 1: Pain level  will decrease to 4/10 with cervical mobility (met)  Short term goal 2: Pt will exhibit increased cervical ROM by > 5 deg in all planes (met except flexion due to breathing compromise)  Short term goal 3: Pt will attain neutral posture without manual cues in order to complete HEP correctly  Short term goal 4: Pt's neck disability index with decrease to less than 25%  Short term goal 5: Soft tissue restrictions at the upper traps will reduce to minimal  Long term goals  Time Frame for Long term goals : 16-18 visits  Long term goal 1: Pain level  will decrease to 2/10 with cervical mobility including neck movement while looking at computer and keyboard. Long term goal 2: Pt will exhibit increased cervical ROM to be WNL in all planes  Long term goal 3: Pt will exhibit 5/5 cervical strength in all planes and R scapular strength middle and lower traps to be 5/5  Long term goal 4: Pt will push a sled weighing 70 lbs without an increase in cervical pain  Long term goal 5: Pt will be independent with HEP  Progress toward goals: reduction of soft tissue restrictions, strengthening  POST-PAIN       Pain Rating (0-10 pain scale):   2/10   Location and pain description same as pre-treatment unless indicated. Action: [] NA   [] Perform HEP  [] Meds as prescribed  [] Modalities as prescribed   [] Call Physician   Frequency/Duration:  Plan  Times per week: 2-3  Plan weeks: 4-6 weeks  Current Treatment Recommendations: Strengthening,ROM,Functional Mobility Training,Neuromuscular Re-education,Manual Therapy - Soft Tissue Mobilization,Manual Therapy - Joint Manipulation,Home Exercise Program,Modalities,Integrated Dry Needling   Pt to continue current HEP. See objective section for any therapeutic exercise changes, additions or modifications this date.   PT Individual Minutes  Time In: 1000  Time Out: 1107  Minutes: 67  Timed Code Treatment Minutes: 57 Minutes  Procedure Minutes:10 min moist heat 1057 to 1107     Modality Time In Time Out Total Minutes Units    Ther ex ([id number]) 1024 1057 32 2   Manual Therapy ([id number]) 1000 1025 25 2     Signature:  Electronically signed by Gerson Menon PT on 2/9/22 at 2:50 PM EST

## 2022-02-11 ENCOUNTER — HOSPITAL ENCOUNTER (OUTPATIENT)
Dept: PHYSICAL THERAPY | Age: 57
Setting detail: THERAPIES SERIES
Discharge: HOME | End: 2022-02-11
Payer: COMMERCIAL

## 2022-02-11 PROCEDURE — G0283 ELEC STIM OTHER THAN WOUND: HCPCS

## 2022-02-11 PROCEDURE — 97140 MANUAL THERAPY 1/> REGIONS: CPT

## 2022-02-11 PROCEDURE — 97110 THERAPEUTIC EXERCISES: CPT

## 2022-02-11 ASSESSMENT — PAIN DESCRIPTION - ORIENTATION: ORIENTATION: LEFT

## 2022-02-11 ASSESSMENT — PAIN DESCRIPTION - DESCRIPTORS: DESCRIPTORS: ACHING

## 2022-02-11 ASSESSMENT — PAIN DESCRIPTION - PAIN TYPE: TYPE: ACUTE PAIN

## 2022-02-11 ASSESSMENT — PAIN SCALES - GENERAL: PAINLEVEL_OUTOF10: 2

## 2022-02-11 ASSESSMENT — PAIN DESCRIPTION - LOCATION: LOCATION: NECK

## 2022-02-11 NOTE — PROGRESS NOTES
[address] [facility] at the [facility]  Outpatient Physical Therapy    Treatment Note        Date: 2022  Patient: Kwame Henry  : 1965  ACCT #: [de-identified]  Referring Practitioner: Dr. Praful Talavera  Diagnosis: cervical pain  Treatment Diagnosis: Cervical pain. Problem list:  1. Pain level 8/10 with movement   2. Decreased cervical ROM   3. Decreased cervical and R scapular strength    4. Decreased endurance causing difficulty in completing work tasks   5. Decreased ability to attain neutral posture. 6.  Neck disability 48%     Visit Information:  PT Visit Information  Onset Date: 22  PT Insurance Information: [id number]  Total # of Visits Approved: 18  Total # of Visits to Date: 7  Plan of Care/Certification Expiration Date: 22  No Show: 0  Progress Note Due Date: 22  Canceled Appointment: 0  Progress Note Counter:     Subjective: Pt reports last night her pain was \"terrible\" after work, but today it's better. Pt reports numbness and tingling in L UE since last night at work.      HEP Compliance:  [x] Good [] Fair [] Poor [] Reports not doing due to:    Vital Signs  Patient Currently in Pain: Yes   Pain Screening  Patient Currently in Pain: Yes  Pain Assessment  Pain Assessment: 0-10  Pain Level: 2  Pain Type: Acute pain  Pain Location: Neck  Pain Orientation: Left  Pain Descriptors: Aching    OBJECTIVE:   Exercises  Exercise 1: active cervical flexion/extension within a pain free ROM  Exercise 2: active cervical lateral flexion within a pain free ROM progress to cervical sidebend stretch 4 x 20 sec each- manual assist to decrease compensations at B upper traps  Exercise 5: unilateral pull backs with red theraband each side x 10  Exercise 6: prone middle trap x 10 B  Exercise 7: prone lower trap x 10 B  Exercise 8: supine wand (2lbs) B shoulder flexion within a pain free ROM  Exercise 9: supine with wand reaching downward (scapular depression) x 10  Exercise 10: standing scapular retraction term goal 2: Pt will exhibit increased cervical ROM by > 5 deg in all planes (met except flexion due to breathing compromise)  Short term goal 3: Pt will attain neutral posture without manual cues in order to complete HEP correctly  Short term goal 4: Pt's neck disability index with decrease to less than 25%  Short term goal 5: Soft tissue restrictions at the upper traps will reduce to minimal  Long term goals  Time Frame for Long term goals : 16-18 visits  Long term goal 1: Pain level  will decrease to 2/10 with cervical mobility including neck movement while looking at computer and keyboard. Long term goal 2: Pt will exhibit increased cervical ROM to be WNL in all planes  Long term goal 3: Pt will exhibit 5/5 cervical strength in all planes and R scapular strength middle and lower traps to be 5/5  Long term goal 4: Pt will push a sled weighing 70 lbs without an increase in cervical pain  Long term goal 5: Pt will be independent with HEP  Progress toward goals: inc strength, ROM, improve postural awareness, dec pain w. Functional mobility    POST-PAIN       Pain Rating (0-10 pain scale):   2/10   Location and pain description same as pre-treatment unless indicated. Action: [] NA   [x] Perform HEP  [] Meds as prescribed  [] Modalities as prescribed   [] Call Physician     Frequency/Duration:  Plan  Times per week: 2-3  Plan weeks: 4-6 weeks  Current Treatment Recommendations: Strengthening,ROM,Functional Mobility Training,Neuromuscular Re-education,Manual Therapy - Soft Tissue Mobilization,Manual Therapy - Joint Manipulation,Home Exercise Program,Modalities,Integrated Dry Needling     Pt to continue current HEP. See objective section for any therapeutic exercise changes, additions or modifications this date.     PT Individual Minutes  Time In: 1006  Time Out: 3411  Minutes: 63  Timed Code Treatment Minutes: 53 Minutes  Procedure Minutes:10     Modality Time In Time Out Total Minutes Units    Ther ex ([id number]   Manual Therapy ([id number]   MHP ([id number]) [phone] 0   Estim unattended   ([phone] 1     Signature:  Electronically signed by Jody Cavanaugh PTA on 2/11/22 at 10:05 AM EST

## 2022-02-14 ENCOUNTER — HOSPITAL ENCOUNTER (OUTPATIENT)
Dept: PHYSICAL THERAPY | Age: 57
Setting detail: THERAPIES SERIES
Discharge: HOME | End: 2022-02-14
Payer: COMMERCIAL

## 2022-02-14 PROCEDURE — G0283 ELEC STIM OTHER THAN WOUND: HCPCS

## 2022-02-14 PROCEDURE — 97140 MANUAL THERAPY 1/> REGIONS: CPT

## 2022-02-14 PROCEDURE — 97110 THERAPEUTIC EXERCISES: CPT

## 2022-02-14 ASSESSMENT — PAIN DESCRIPTION - ORIENTATION: ORIENTATION: RIGHT;LEFT

## 2022-02-14 ASSESSMENT — PAIN DESCRIPTION - LOCATION: LOCATION: NECK

## 2022-02-14 ASSESSMENT — PAIN DESCRIPTION - DESCRIPTORS: DESCRIPTORS: ACHING;TIGHTNESS

## 2022-02-14 ASSESSMENT — PAIN SCALES - GENERAL: PAINLEVEL_OUTOF10: 1

## 2022-02-14 ASSESSMENT — PAIN DESCRIPTION - PAIN TYPE: TYPE: ACUTE PAIN

## 2022-02-14 NOTE — PROGRESS NOTES
[address] [facility]  Outpatient Physical Therapy    Treatment Note        Date: 2022  Patient: Antwan Jay  : 1965  ACCT #: [de-identified]  Referring Practitioner: Dr. Tomasa Gimenez  Diagnosis: cervical pain  Treatment Diagnosis: Cervical pain. Problem list:  1. Pain level 8/10 with movement   2. Decreased cervical ROM   3. Decreased cervical and R scapular strength    4. Decreased endurance causing difficulty in completing work tasks   5. Decreased ability to attain neutral posture. 6.  Neck disability 48%     Visit Information:  PT Visit Information  Onset Date: 22  PT Insurance Information: [id number]  Total # of Visits Approved: 18  Total # of Visits to Date: 8  Plan of Care/Certification Expiration Date: 22  No Show: 0  Canceled Appointment: 0  Progress Note Counter:     Subjective: Good compliance reported with HEP, pt reports increased pain when performing prone Lower Trap exercises and have been holding off on performing at home.      HEP Compliance:  [x] Good [] Fair [] Poor [] Reports not doing due to:    Vital Signs  Patient Currently in Pain: Yes   Pain Screening  Patient Currently in Pain: Yes  Pain Assessment  Pain Assessment: 0-10  Pain Level: 1  Pain Type: Acute pain  Pain Location: Neck  Pain Orientation: Right;Left  Pain Descriptors: Aching;Tightness    OBJECTIVE:   Exercises  Exercise 1: active cervical flexion/extension within a pain free ROM  Exercise 2: active cervical lateral flexion within a pain free ROM progress to cervical sidebend stretch 4 x 20 sec each- manual assist to decrease compensations at B upper traps  Exercise 5: unilateral pull backs with red theraband each side x 10  Exercise 6: prone middle trap x 10 B  Exercise 7: prone lower trap x 10 B  Exercise 8: supine wand (2lbs) B shoulder flexion within a pain free ROM  Exercise 9: supine with wand reaching downward (scapular depression) x 10  Exercise 10: standing scapular retraction with red theraband resistance x 10  Exercise 11: standing scapular pull downs with elbows bent with red theraband resistance x 10         Strength: [x] NT  [] MMT completed:        ROM: [x] NT  [] ROM measurements:         Manual:   Manual therapy  Joint mobilization: scapular mobilization in all planes emphasizing depression and retraction each side  Manual traction: cervical manual traction intermittent with 30 sec holds  Soft Tissue Mobalization: performed at the B upper traps, occipitals, and B scapulothoracic region while in prone  Other: total manual 33 min    Modalities:  Modalities  Moist heat: provided to b/l cervical/scapular region post ex concurrent w/ IFC x 10 min  E-stim (parameters): IFC to the B cervical/upper traps region x 10 min     *Indicates exercise, modality, or manual techniques to be initiated when appropriate    Assessment: Body structures, Functions, Activity limitations: Decreased functional mobility ,Decreased ROM,Decreased strength,Decreased endurance,Increased pain,Decreased posture  Assessment: Extensive time spent on manual therapy to reduce muscle tension and improve  cervical mobility. Improved ability to perform cervical side bends today with VCs for improved form and techniques, Modified Prone Lower Trap due to increased pain reported with current exercise. Treatment Diagnosis: Cervical pain. Problem list:  1. Pain level 8/10 with movement   2. Decreased cervical ROM   3. Decreased cervical and R scapular strength    4. Decreased endurance causing difficulty in completing work tasks   5. Decreased ability to attain neutral posture.   6.  Neck disability 48%  Prognosis: Good       Goals:  Short term goals  Time Frame for Short term goals: 7-8 visits  Short term goal 1: Pain level  will decrease to 4/10 with cervical mobility (met)  Short term goal 2: Pt will exhibit increased cervical ROM by > 5 deg in all planes (met except flexion due to breathing compromise)  Short term goal 3: Pt will attain neutral posture without manual cues in order to complete HEP correctly  Short term goal 4: Pt's neck disability index with decrease to less than 25%  Short term goal 5: Soft tissue restrictions at the upper traps will reduce to minimal    Long term goals  Time Frame for Long term goals : 16-18 visits  Long term goal 1: Pain level  will decrease to 2/10 with cervical mobility including neck movement while looking at computer and keyboard. Long term goal 2: Pt will exhibit increased cervical ROM to be WNL in all planes  Long term goal 3: Pt will exhibit 5/5 cervical strength in all planes and R scapular strength middle and lower traps to be 5/5  Long term goal 4: Pt will push a sled weighing 70 lbs without an increase in cervical pain  Long term goal 5: Pt will be independent with HEP  Progress toward goals: on going     POST-PAIN       Pain Rating (0-10 pain scale):   0/10   Location and pain description same as pre-treatment unless indicated. Action: [] NA   [x] Perform HEP  [] Meds as prescribed  [] Modalities as prescribed   [] Call Physician     Frequency/Duration:  Plan  Times per week: 2-3  Plan weeks: 4-6 weeks  Current Treatment Recommendations: Strengthening,ROM,Functional Mobility Training,Neuromuscular Re-education,Manual Therapy - Soft Tissue Mobilization,Manual Therapy - Joint Manipulation,Home Exercise Program,Modalities,Integrated Dry Needling     Pt to continue current HEP. See objective section for any therapeutic exercise changes, additions or modifications this date.          PT Individual Minutes  Time In: 4344  Time Out: 3689  Minutes: 64  Timed Code Treatment Minutes: 54 Minutes  Procedure Minutes:10 IFC/HP post exercise      Modality Time In Time Out Total Minutes Units    Ther ex ([id number]) [id number] 21 1   Manual Therapy ([id number]) [id number] 97 2                 Estim unattended   ([phone] 1     Signature:  Electronically signed by Kwame Joseph PTA on 2/14/22 at 11:03 AM EST

## 2022-02-15 ENCOUNTER — HOSPITAL ENCOUNTER (OUTPATIENT)
Dept: WOMENS IMAGING | Age: 57
Discharge: HOME | End: 2022-02-17
Payer: COMMERCIAL

## 2022-02-15 VITALS — HEIGHT: 62 IN | BODY MASS INDEX: 21.03 KG/M2

## 2022-02-15 DIAGNOSIS — Z12.31 ENCOUNTER FOR SCREENING MAMMOGRAM FOR MALIGNANT NEOPLASM OF BREAST: ICD-10-CM

## 2022-02-15 PROCEDURE — 77067 SCR MAMMO BI INCL CAD: CPT

## 2022-02-16 ENCOUNTER — HOSPITAL ENCOUNTER (OUTPATIENT)
Dept: PHYSICAL THERAPY | Age: 57
Setting detail: THERAPIES SERIES
Discharge: HOME | End: 2022-02-16
Payer: COMMERCIAL

## 2022-02-16 PROCEDURE — G0283 ELEC STIM OTHER THAN WOUND: HCPCS

## 2022-02-16 PROCEDURE — 97140 MANUAL THERAPY 1/> REGIONS: CPT

## 2022-02-16 PROCEDURE — 97110 THERAPEUTIC EXERCISES: CPT

## 2022-02-16 ASSESSMENT — PAIN SCALES - GENERAL: PAINLEVEL_OUTOF10: 1

## 2022-02-16 ASSESSMENT — PAIN DESCRIPTION - PAIN TYPE: TYPE: ACUTE PAIN

## 2022-02-16 ASSESSMENT — PAIN DESCRIPTION - DESCRIPTORS: DESCRIPTORS: TIGHTNESS

## 2022-02-16 ASSESSMENT — PAIN DESCRIPTION - LOCATION: LOCATION: NECK

## 2022-02-16 ASSESSMENT — PAIN DESCRIPTION - ORIENTATION: ORIENTATION: MID

## 2022-02-16 NOTE — PROGRESS NOTES
[address] [facility]  Outpatient Physical Therapy    Treatment Note        Date: 2022  Patient: Priscila Stubbs  : 1965  ACCT #: [de-identified]  Referring Practitioner: Dr. Marika Ortiz  Diagnosis: cervical pain  Treatment Diagnosis: Cervical pain. Problem list:  1. Pain level 8/10 with movement   2. Decreased cervical ROM   3. Decreased cervical and R scapular strength    4. Decreased endurance causing difficulty in completing work tasks   5. Decreased ability to attain neutral posture. 6.  Neck disability 48%   Visit Information:  PT Visit Information  Onset Date: 22  PT Insurance Information: [facility]  Total # of Visits Approved: 18  Total # of Visits to Date: 9  Plan of Care/Certification Expiration Date: 22  Progress Note Counter:   Subjective: Pt reports she is good in the morning with 1/10, but by the end of her work shift, pain level is 7/10.    HEP Compliance:  [x] Good [] Fair [] Poor [] Reports not doing due to:  Vital Signs  Patient Currently in Pain: Yes   Pain Screening  Patient Currently in Pain: Yes  Pain Assessment  Pain Assessment: 0-10  Pain Level: 1  Pain Type: Acute pain  Pain Location: Neck  Pain Orientation: Mid  Pain Descriptors: Tightness    OBJECTIVE:   Exercises  Exercise 1: active cervical flexion/extension within a pain free ROM  Exercise 2: active cervical lateral flexion within a pain free ROM  Exercise 5: unilateral triceps pull backs with red theraband each side x 10  Exercise 6: prone middle trap x 10 B  Exercise 7: prone lower trap x 10 B  Exercise 10: standing scapular retraction with red theraband resistance x 10  Exercise 11: standing scapular pull downs with elbows bent with red theraband resistance x 10  Strength: [x] NT  [] MMT completed:  ROM: [x] NT  [] ROM measurements:   Manual:   Manual therapy  Joint mobilization: thoracic joint mobilization performed emphasizing lateral mobilitiy of the spinous process and facilitating neutral alignment of both cervical and thoracic alignment  Manual traction: cervical manual traction intermittent with 30 sec holds  Soft Tissue Mobalization: performed at the B upper traps, occipitals, and B scapulothoracic region while in prone  Other: total manual 30 min  Modalities:  Modalities  Moist heat: provided in conjunction with IFC to cervical thoracic region x 10 min  E-stim (parameters): IFC to the B cervical/thoracic region x 10 min   *Indicates exercise, modality, or manual techniques to be initiated when appropriate  Assessment: Body structures, Functions, Activity limitations: Decreased functional mobility ,Decreased ROM,Decreased strength,Decreased endurance,Increased pain,Decreased posture  Assessment: Pt reported being pain free for a few minutes while in prone receiving IFC/heat to the cervical/thoracic region. Various upper thoracic vertebrae are rotated to the R with spinous processed positioned L palpated best in prone. Initiated thoracic joint mobilization to facilitate improved mobility at rotated vertebrae to attain neutral alignment. Thoracic paraspinals on L are also restricted primarily at levels or rotated vertebrae, reduced with STM  Treatment Diagnosis: Cervical pain. Problem list:  1. Pain level 8/10 with movement   2. Decreased cervical ROM   3. Decreased cervical and R scapular strength    4. Decreased endurance causing difficulty in completing work tasks   5. Decreased ability to attain neutral posture.   6.  Neck disability 48%  PT Education: Home Exercise Program  Patient Education: provided pt with red theraband for scapular pull backs, pull downs and unilateral triceps ex    Goals:  Short term goals  Time Frame for Short term goals: 7-8 visits  Short term goal 1: Pain level  will decrease to 4/10 with cervical mobility (met)  Short term goal 2: Pt will exhibit increased cervical ROM by > 5 deg in all planes (met except flexion due to breathing compromise)  Short term goal 3: Pt will attain neutral posture without manual cues in order to complete HEP correctly  Short term goal 4: Pt's neck disability index with decrease to less than 25%  Short term goal 5: Soft tissue restrictions at the upper traps will reduce to minimal    Long term goals  Time Frame for Long term goals : 16-18 visits  Long term goal 1: Pain level  will decrease to 2/10 with cervical mobility including neck movement while looking at computer and keyboard. Long term goal 2: Pt will exhibit increased cervical ROM to be WNL in all planes  Long term goal 3: Pt will exhibit 5/5 cervical strength in all planes and R scapular strength middle and lower traps to be 5/5  Long term goal 4: Pt will push a sled weighing 70 lbs without an increase in cervical pain  Long term goal 5: Pt will be independent with HEP  Progress toward goals:joint mobility and strengthening    POST-PAIN       Pain Rating (0-10 pain scale): 1/10   Location and pain description same as pre-treatment unless indicated. Action: [] NA   [x] Perform HEP  [] Meds as prescribed  [] Modalities as prescribed   [] Call Physician   Frequency/Duration:  Plan  Times per week: 2-3  Plan weeks: 4-6 weeks  Current Treatment Recommendations: Strengthening,ROM,Functional Mobility Training,Neuromuscular Re-education,Manual Therapy - Soft Tissue Mobilization,Manual Therapy - Joint Manipulation,Home Exercise Program,Modalities,Integrated Dry Needling   Pt to continue current HEP. See objective section for any therapeutic exercise changes, additions or modifications this date.   Treatment Initiated : thoracic joint mobilization    PT Individual Minutes  Time In: 1003  Time Out: 1100  Minutes: 57  Timed Code Treatment Minutes: 47 Minutes  Procedure Minutes: 10 min E. stim with moist heat     Modality Time In Time Out Total Minutes Units    Ther ex ([id number]) 1030 1050 17 1   Manual Therapy ([phone] 30 2   Estim unattended   ([id number]) 1050 1100 10 1     Signature:  Electronically signed by Steve Gamino, PT on 2/16/22 at 11:17 AM EST

## 2022-02-18 ENCOUNTER — HOSPITAL ENCOUNTER (OUTPATIENT)
Dept: PHYSICAL THERAPY | Age: 57
Setting detail: THERAPIES SERIES
Discharge: HOME | End: 2022-02-18
Payer: COMMERCIAL

## 2022-02-18 PROCEDURE — 97140 MANUAL THERAPY 1/> REGIONS: CPT

## 2022-02-18 PROCEDURE — 97110 THERAPEUTIC EXERCISES: CPT

## 2022-02-18 PROCEDURE — G0283 ELEC STIM OTHER THAN WOUND: HCPCS

## 2022-02-18 NOTE — PROGRESS NOTES
[address] [facility]  Outpatient Physical Therapy    Treatment Note        Date: 2022  Patient: Reed Obrien  : 1965  ACCT #: [de-identified]  Referring Practitioner: Dr. Hendrix Dover  Diagnosis: cervical pain  Treatment Diagnosis: Cervical pain. Problem list:  1. Pain level 8/10 with movement   2. Decreased cervical ROM   3. Decreased cervical and R scapular strength    4. Decreased endurance causing difficulty in completing work tasks   5. Decreased ability to attain neutral posture. 6.  Neck disability 48%     Visit Information:  PT Visit Information  Onset Date: 22  PT Insurance Information: [facility]  Total # of Visits Approved: 18  Total # of Visits to Date: 10  Plan of Care/Certification Expiration Date: 22  No Show: 0  Canceled Appointment: 0  Progress Note Counter: 10/18    Subjective: Pt reports she had a little pain following last session, but overall it felt better and she doesn't have the \"stabbing\" pain in her mid-back anymore.      HEP Compliance:  [x] Good [] Fair [] Poor [] Reports not doing due to:    Vital Signs  Patient Currently in Pain: Yes   Pain Screening  Patient Currently in Pain: Yes    OBJECTIVE:   Exercises  Exercise 1: active cervical flexion/extension within a pain free ROM  Exercise 2: active cervical lateral flexion within a pain free ROM    Strength: [x] NT  [] MMT completed:     ROM: [x] NT  [] ROM measurements:     Manual:   Manual therapy  Joint mobilization: thoracic joint mobilization performed emphasizing lateral mobilitiy of the spinous process and facilitating neutral alignment of both cervical and thoracic alignment  Manual traction: cervical manual traction intermittent with 30 sec holds  Soft Tissue Mobalization: performed at the B upper traps, levators, occipitals, and B scapulothoracic region while in prone and supine  Other: total manual 30 min    Modalities:  Modalities  Moist heat: provided in conjunction with IFC to cervical thoracic region x 10 min  E-stim (parameters): IFC to the B cervical/thoracic region x 10 min     *Indicates exercise, modality, or manual techniques to be initiated when appropriate    Assessment: Body structures, Functions, Activity limitations: Decreased functional mobility ,Decreased ROM,Decreased strength,Decreased endurance,Increased pain,Decreased posture  Assessment: Cont to focus on manual techniques w/ emphasis on left>right upper traps and levators as present presenting w/ multiple nodules and trigger points. Also cont'd w/ thoracic joint mobilization to facilitate improved mobility as pt feels this is helping. Pt requesting to leave early d/t another appt therefore all therex not addressed this date, but instructed pt to cont w/ HEP. Treatment Diagnosis: Cervical pain. Problem list:  1. Pain level 8/10 with movement   2. Decreased cervical ROM   3. Decreased cervical and R scapular strength    4. Decreased endurance causing difficulty in completing work tasks   5. Decreased ability to attain neutral posture. 6.  Neck disability 48%  Prognosis: Good       Goals:  Short term goals  Time Frame for Short term goals: 7-8 visits  Short term goal 1: Pain level  will decrease to 4/10 with cervical mobility (met)  Short term goal 2: Pt will exhibit increased cervical ROM by > 5 deg in all planes (met except flexion due to breathing compromise)  Short term goal 3: Pt will attain neutral posture without manual cues in order to complete HEP correctly  Short term goal 4: Pt's neck disability index with decrease to less than 25%  Short term goal 5: Soft tissue restrictions at the upper traps will reduce to minimal    Long term goals  Time Frame for Long term goals : 16-18 visits  Long term goal 1: Pain level  will decrease to 2/10 with cervical mobility including neck movement while looking at computer and keyboard.   Long term goal 2: Pt will exhibit increased cervical ROM to be WNL in all planes  Long term goal 3: Pt will exhibit 5/5 cervical strength in all planes and R scapular strength middle and lower traps to be 5/5  Long term goal 4: Pt will push a sled weighing 70 lbs without an increase in cervical pain  Long term goal 5: Pt will be independent with HEP  Progress toward goals: inc strength, ROM, decrease pain    POST-PAIN       Pain Rating (0-10 pain scale):   0/10   Location and pain description same as pre-treatment unless indicated. Action: [] NA   [x] Perform HEP  [] Meds as prescribed  [] Modalities as prescribed   [] Call Physician     Frequency/Duration:  Plan  Times per week: 2-3  Plan weeks: 4-6 weeks  Current Treatment Recommendations: Strengthening,ROM,Functional Mobility Training,Neuromuscular Re-education,Manual Therapy - Soft Tissue Mobilization,Manual Therapy - Joint Manipulation,Home Exercise Program,Modalities,Integrated Dry Needling     Pt to continue current HEP. See objective section for any therapeutic exercise changes, additions or modifications this date.     PT Individual Minutes  Time In: 1012  Time Out: 1101  Minutes: 49  Timed Code Treatment Minutes: 39 Minutes  Procedure Minutes:10     Modality Time In Time Out Total Minutes Units    Ther ex ([id number]) 3183 [id number] 9 1   Manual Therapy ([id number]) [id number] 8431 30 2   MHP ([id number]) 1051 1101 10 0   Estim unattended   ([id number]) 1051 1101 10 1     Signature:  Electronically signed by Lb Albarado PTA on 2/18/22 at 10:11 AM EST

## 2022-02-21 ENCOUNTER — HOSPITAL ENCOUNTER (OUTPATIENT)
Dept: PHYSICAL THERAPY | Age: 57
Setting detail: THERAPIES SERIES
Discharge: HOME | End: 2022-02-21
Payer: COMMERCIAL

## 2022-02-21 PROCEDURE — 97110 THERAPEUTIC EXERCISES: CPT

## 2022-02-21 PROCEDURE — 97140 MANUAL THERAPY 1/> REGIONS: CPT

## 2022-02-21 ASSESSMENT — PAIN DESCRIPTION - DESCRIPTORS: DESCRIPTORS: TIGHTNESS

## 2022-02-21 ASSESSMENT — PAIN DESCRIPTION - PAIN TYPE: TYPE: ACUTE PAIN

## 2022-02-21 ASSESSMENT — PAIN DESCRIPTION - FREQUENCY: FREQUENCY: INTERMITTENT

## 2022-02-21 ASSESSMENT — PAIN DESCRIPTION - LOCATION: LOCATION: NECK

## 2022-02-21 ASSESSMENT — PAIN SCALES - GENERAL: PAINLEVEL_OUTOF10: 1

## 2022-02-21 NOTE — PROGRESS NOTES
[address] [facility]  Outpatient Physical Therapy    Treatment Note        Date: 2022  Patient: Bonilla Perez  : 1965  ACCT #: [de-identified]  Referring Practitioner: Dr. Mulugeta Kapoor  Diagnosis: cervical pain  Treatment Diagnosis: Cervical pain. Problem list:  1. Pain level 8/10 with movement   2. Decreased cervical ROM   3. Decreased cervical and R scapular strength    4. Decreased endurance causing difficulty in completing work tasks   5. Decreased ability to attain neutral posture.   6.  Neck disability 48%   Visit Information:  PT Visit Information  Onset Date: 22  PT Insurance Information: [facility]  Total # of Visits Approved: 18  Total # of Visits to Date:   Plan of Care/Certification Expiration Date: 22  Progress Note Due Date: 22  Progress Note Counter:     Subjective: Pt reporting a lessening in discomfort in her mid back and neck   HEP Compliance:  [x] Good [] Fair [] Poor [] Reports not doing due to:  Vital Signs  Patient Currently in Pain: Yes   Pain Screening  Patient Currently in Pain: Yes  Pain Assessment  Pain Assessment: 0-10  Pain Level: 1  Pain Type: Acute pain  Pain Location: Neck  Pain Descriptors: Tightness  Pain Frequency: Intermittent    OBJECTIVE:   Exercises  Exercise 1: active cervical flexion/extension within a pain free ROM  Exercise 2: active cervical lateral flexion within a pain free ROM  Exercise 5: unilateral triceps pull backs with red theraband each side x 10  Exercise 6: prone middle trap x 10 B progress to prone rows with 3 lbs  Exercise 7: prone lower trap x 10 B  Exercise 8: supine wand (2lbs) B shoulder flexion within a pain free ROM  Exercise 9: supine positioning with rolled up towel horizontal at scapula level for passive cervical/thoracic cervical extension  Exercise 10: supine B scapular adduction/pectoral stretch holds 5 sec x 10  Exercise 12: standing scapular retraction with red theraband resistance x 10   Strength: [x] NT  [] MMT completed:  ROM: [] NT  [] ROM measurements:   Spine  Cervical: Flexion 33 with pt's breathing affected, Ext 35,  Rotation R 55, L 60, lateral flexion R 20, L 28  Manual:   Manual therapy  Joint mobilization: thoracic joint mobilization performed emphasizing lateral  and A/P mobility of the spinous process and facilitating neutral alignment of both cervical and thoracic alignment  Manual traction: cervical manual traction intermittent with 30 sec holds  Soft Tissue Mobalization: performed at the B upper traps, levators, occipitals, and B scapulothoracic region while in prone and supine  Other: total manual 20 min  Modalities:  Modalities  Moist heat: provided post exercise to B cervical/scapular region x 10 min     *Indicates exercise, modality, or manual techniques to be initiated when appropriate  Assessment: Body structures, Functions, Activity limitations: Decreased functional mobility ,Decreased ROM,Decreased strength,Decreased endurance,Increased pain,Decreased posture  Assessment: Cervical pain much better managed with improving scapulothoracic mobility and manual techniques. Pt has been off work for past 3 days, and pain has not  been a problem during this time. Focus on active scapulothoracic mobility to assist with overal cervical mobility. Treatment Diagnosis: Cervical pain. Problem list:  1. Pain level 8/10 with movement   2. Decreased cervical ROM   3. Decreased cervical and R scapular strength    4. Decreased endurance causing difficulty in completing work tasks   5. Decreased ability to attain neutral posture.   6.  Neck disability 48%  Prognosis: Good     Goals:  Short term goals  Time Frame for Short term goals: 7-8 visits  Short term goal 1: Pain level  will decrease to 4/10 with cervical mobility (met)  Short term goal 2: Pt will exhibit increased cervical ROM by > 5 deg in all planes (met except flexion due to breathing compromise)  Short term goal 3: Pt will attain neutral posture without manual cues in order to complete HEP correctly  Short term goal 4: Pt's neck disability index with decrease to less than 25%  Short term goal 5: Soft tissue restrictions at the upper traps will reduce to minimal    Long term goals  Time Frame for Long term goals : 16-18 visits  Long term goal 1: Pain level  will decrease to 2/10 with cervical mobility including neck movement while looking at computer and keyboard. Long term goal 2: Pt will exhibit increased cervical ROM to be WNL in all planes  Long term goal 3: Pt will exhibit 5/5 cervical strength in all planes and R scapular strength middle and lower traps to be 5/5  Long term goal 4: Pt will push a sled weighing 70 lbs without an increase in cervical pain  Long term goal 5: Pt will be independent with HEP  Progress toward goals:increasing cervical, scapular, and thoracic ROM  POST-PAIN       Pain Rating (0-10 pain scale): 1/10   Location and pain description same as pre-treatment unless indicated. Action: [] NA   [x] Perform HEP  [] Meds as prescribed  [] Modalities as prescribed   [] Call Physician   Frequency/Duration:  Plan  Times per week: 2-3  Plan weeks: 4-6 weeks  Current Treatment Recommendations: Strengthening,ROM,Functional Mobility Training,Neuromuscular Re-education,Manual Therapy - Soft Tissue Mobilization,Manual Therapy - Joint Manipulation,Home Exercise Program,Modalities,Integrated Dry Needling     Pt to continue current HEP. See objective section for any therapeutic exercise changes, additions or modifications this date.   PT Individual Minutes  Time In: 1002  Time Out: 1100  Minutes: 58  Timed Code Treatment Minutes: 48 Minutes  Procedure Minutes:10 min moist heat     Modality Time In Time Out Total Minutes Units    Ther ex [id number]) 1020 4600 28 2   Manual Therapy ([id number]) 7054 1022 20 1     Signature:  Electronically signed by Douglas Palmer PT on 2/21/22 at 11:32 AM EST

## 2022-02-23 ENCOUNTER — HOSPITAL ENCOUNTER (OUTPATIENT)
Dept: PHYSICAL THERAPY | Age: 57
Setting detail: THERAPIES SERIES
Discharge: HOME | End: 2022-02-23
Payer: COMMERCIAL

## 2022-02-23 PROCEDURE — 97110 THERAPEUTIC EXERCISES: CPT

## 2022-02-23 PROCEDURE — 97140 MANUAL THERAPY 1/> REGIONS: CPT

## 2022-02-23 ASSESSMENT — PAIN DESCRIPTION - LOCATION: LOCATION: NECK

## 2022-02-23 NOTE — PROGRESS NOTES
[address] [facility]  Outpatient Physical Therapy    Treatment Note        Date: 2022  Patient: Sydnee Tirado  : 1965  ACCT #: [de-identified]  Referring Practitioner: Dr. Lawrnce Landau  Diagnosis: cervical pain  Treatment Diagnosis: Cervical pain. Problem list:  1. Pain level 8/10 with movement   2. Decreased cervical ROM   3. Decreased cervical and R scapular strength    4. Decreased endurance causing difficulty in completing work tasks   5. Decreased ability to attain neutral posture.   6.  Neck disability 48%     Visit Information:  PT Visit Information  Onset Date: 22  PT Insurance Information: [facility]  Total # of Visits Approved: 18  Total # of Visits to Date: 12  No Show: 0  Canceled Appointment: 0  Progress Note Counter:     Subjective: pt reports she is feeling good   HEP Compliance:  [x] Good [] Fair [] Poor [] Reports not doing due to:    Vital Signs  Patient Currently in Pain: Denies   Pain Screening  Patient Currently in Pain: Denies  Pain Assessment  Pain Location: Neck    OBJECTIVE:   Exercises  Exercise 1: active cervical flexion/extension within a pain free ROM  Exercise 2: active cervical lateral flexion within a pain free ROM  Exercise 4: sitting scapular retraction B (without compensation of trap elevation)  Exercise 5: unilateral triceps pull backs with red theraband each side x 10  Exercise 6: prone middle trap x 10 B progress to prone rows with 3 lbs  Exercise 7: prone lower trap x 10 B  Exercise 11: standing scapular pull downs with elbows bent with red theraband resistance x 10  Exercise 12: standing scapular retraction with red theraband resistance x 10   Strength: [] NT  [x] MMT completed:  Strength REX  Comment: R middle trap strength 4-/5, lower trap strength 4-/5   Strength Other  Other: cervical flexion 4/5 extension 4+/5, rotation in each direction 4/5. lateral flexion 4/5 to the L and 4/5 to R  ROM: [] NT  [x] ROM measurements:   Manual:   Manual therapy  Joint mobilization: thoracic joint mobilization performed emphasizing lateral  and A/P mobility of the spinous process and facilitating neutral alignment of both cervical and thoracic alignment  PROM: sidelying L scapular mobilization emphasizing retraction, depression and thoracic extension  Manual traction: cervical manual traction intermittent with 30 sec holds  Soft Tissue Mobalization: performed at the B upper traps,  occipitals, and B scapulothoracic region while in prone and supine  Other: total manual 23 min    Modalities:  Modalities  Moist heat: provided post exercise to B cervical/scapular region x 10 min     *Indicates exercise, modality, or manual techniques to be initiated when appropriate  Assessment:   Activity Tolerance  Activity Tolerance: Patient limited by endurance    Body structures, Functions, Activity limitations: Decreased functional mobility ,Decreased ROM,Decreased strength,Decreased endurance,Increased pain,Decreased posture  Assessment: scapulothoracic mobility improving assisting cervical musculature to relax and move normally. Pt reporting pain in the L shoulder with prone scapular strengthening ex (lower trap ex) relieved post ex with heat. Will need to monitor. Treatment Diagnosis: Cervical pain. Problem list:  1. Pain level 8/10 with movement   2. Decreased cervical ROM   3. Decreased cervical and R scapular strength    4. Decreased endurance causing difficulty in completing work tasks   5. Decreased ability to attain neutral posture.   6.  Neck disability 48%  Prognosis: Good     Goals:  Short term goals  Time Frame for Short term goals: 7-8 visits  Short term goal 1: Pain level  will decrease to 4/10 with cervical mobility (met)  Short term goal 2: Pt will exhibit increased cervical ROM by > 5 deg in all planes (met except flexion due to breathing compromise)  Short term goal 3: Pt will attain neutral posture without manual cues in order to complete HEP correctly  Short term goal 4: Pt's neck disability index with decrease to less than 25%  Short term goal 5: Soft tissue restrictions at the upper traps will reduce to minimal    Long term goals  Time Frame for Long term goals : 16-18 visits  Long term goal 1: Pain level  will decrease to 2/10 with cervical mobility including neck movement while looking at computer and keyboard. Long term goal 2: Pt will exhibit increased cervical ROM to be WNL in all planes  Long term goal 3: Pt will exhibit 5/5 cervical strength in all planes and R scapular strength middle and lower traps to be 5/5  Long term goal 4: Pt will push a sled weighing 70 lbs without an increase in cervical pain  Long term goal 5: Pt will be independent with HEP  Progress toward goals:ROM and strength  POST-PAIN       Pain Rating (0-10 pain scale):  2 /10   Location and pain description same as pre-treatment unless indicated. Action: [] NA   [x] Perform HEP  [] Meds as prescribed  [] Modalities as prescribed   [] Call Physician     Frequency/Duration:  Plan  Times per week: 2-3  Plan weeks: 4-6 weeks  Current Treatment Recommendations: Strengthening,ROM,Functional Mobility Training,Neuromuscular Re-education,Manual Therapy - Soft Tissue Mobilization,Manual Therapy - Joint Manipulation,Home Exercise Program,Modalities,Integrated Dry Needling     Pt to continue current HEP. See objective section for any therapeutic exercise changes, additions or modifications this date.   PT Individual Minutes  Time In: 1005  Time Out: 1102  Minutes: 57  Timed Code Treatment Minutes: 47 Minutes  Procedure Minutes:10 min moist heat     Modality Time In Time Out Total Minutes Units    Ther ex ([id number]) [id number] 24 2   Manual Therapy ([id number]) [id number] 23 1     Signature:  Electronically signed by Leonor Aly PT on 2/23/22 at 12:26 PM EST

## 2022-02-25 ENCOUNTER — APPOINTMENT (OUTPATIENT)
Dept: PHYSICAL THERAPY | Age: 57
End: 2022-02-25
Payer: COMMERCIAL

## 2022-02-28 ENCOUNTER — HOSPITAL ENCOUNTER (OUTPATIENT)
Dept: PHYSICAL THERAPY | Age: 57
Setting detail: THERAPIES SERIES
Discharge: HOME | End: 2022-02-28
Payer: COMMERCIAL

## 2022-02-28 NOTE — PROGRESS NOTES
Therapy                            Cancellation/No-show Note      Date:  2022  Patient Name:  Aury Mart  :  1965   MRN:  [id number]  Referring Practitioner: Dr. Cesar Morris  Diagnosis: cervical pain    Visit Information:  PT Visit Information  Onset Date: 22  PT Insurance Information: [facility]  Total # of Visits Approved: 18  Total # of Visits to Date: 12  No Show: 0  Canceled Appointment: 1    For today's appointment patient:  [x]  Cancelled  []  Rescheduled appointment  []  No-show   []  Called pt to remind of next appointment     Reason given by patient:  [x]  Patient ill  []  Conflicting appointment  []  No transportation    []  Conflict with work  []  No reason given  []  Other:      [] Pt has future appointments scheduled, no follow up needed  [] Pt requests to be on hold.     Reason:   If > 2 weeks please discuss with therapist.  [] Therapist to call pt for follow up     Comments:       Signature: Electronically signed by Ramiro Solano PT on 22 at 10:02 AM EST

## 2022-03-02 ENCOUNTER — HOSPITAL ENCOUNTER (OUTPATIENT)
Dept: PHYSICAL THERAPY | Age: 57
Setting detail: THERAPIES SERIES
Discharge: HOME | End: 2022-03-02
Payer: COMMERCIAL

## 2022-03-02 PROCEDURE — 97110 THERAPEUTIC EXERCISES: CPT

## 2022-03-02 PROCEDURE — 97140 MANUAL THERAPY 1/> REGIONS: CPT

## 2022-03-02 ASSESSMENT — PAIN SCALES - GENERAL: PAINLEVEL_OUTOF10: 1

## 2022-03-02 ASSESSMENT — PAIN DESCRIPTION - ORIENTATION: ORIENTATION: RIGHT

## 2022-03-02 ASSESSMENT — PAIN DESCRIPTION - LOCATION: LOCATION: NECK

## 2022-03-02 NOTE — PROGRESS NOTES
[address] [facility]  Outpatient Physical Therapy    Treatment Note        Date: 3/2/2022  Patient: Сергей Rose  : 1965  ACCT #: [de-identified]  Referring Practitioner: Dr. Spike Benson  Diagnosis: cervical pain  Treatment Diagnosis: Cervical pain. Problem list:  1. Pain level 8/10 with movement   2. Decreased cervical ROM   3. Decreased cervical and R scapular strength    4. Decreased endurance causing difficulty in completing work tasks   5. Decreased ability to attain neutral posture.   6.  Neck disability 48%     Visit Information:  PT Visit Information  Onset Date: 22  PT Insurance Information: [facility]  Total # of Visits Approved: 18  Total # of Visits to Date:   No Show: 0  Canceled Appointment: 1  Progress Note Counter:     Subjective: Pt reports she was ill with a respiratory infection and was not too bothered by her neck     HEP Compliance:  [x] Good [] Fair [] Poor [] Reports not doing due to:    Vital Signs  Patient Currently in Pain: Yes   Pain Screening  Patient Currently in Pain: Yes  Pain Assessment  Pain Level: 1  Pain Location: Neck  Pain Orientation: Right    OBJECTIVE:   Exercises  Exercise 1: active cervical flexion/extension within a pain free ROM  Exercise 2: active cervical lateral flexion within a pain free ROM  Exercise 3: active reach depressing each shoulder blade x 10 B in supine or unilateral in sidelying  Exercise 4: sitting scapular retraction B (without compensation of trap elevation)  Exercise 5: unilateral triceps pull backs with red theraband each side x 10  Exercise 6: prone middle trap x 10 unilateral progress to prone rows with 3 lbs  Exercise 7: prone lower trap x 10 B  Exercise 8: Sidelying R UE horizontal abduction x 10  Exercise 9: supine positioning with rolled up towel horizontal at scapula level or supine with no pillow for passive cervical/thoracic cervical extension o  Exercise 10: standing diagonal pull downs to the R  with red theraband resistance x 10  Exercise 11: standing scapular pull downs with elbows bent with red theraband resistance x 10  Exercise 12: standing scapular retraction with red theraband resistance x 10  Exercise 13: wall push ups x 10  Strength: [] NT  [x] MMT completed:   Strength Other  Other: cervical flexion 4/5 extension 4+/5, rotation in each direction 4/5. lateral flexion 4/5 to the L and 4/5 to R  ROM: [] NT  [x] ROM measurements:   Spine  Cervical: Flexion 35 with pt's breathing affected, Ext 40,  Rotation R 55, L 60, lateral flexion R 18, L 30  Manual:   Manual therapy  Joint mobilization: thoracic joint mobilization performed emphasizing lateral  mobility of the spinous process and facilitating neutral alignment of both cervical and thoracic alignment  Manual traction: cervical manual traction intermittent with 30 sec holds  Soft Tissue Mobalization: performed at the B upper traps,  occipitals, and B scapulothoracic region while in prone and supine  Other: total manual 25 min  Modalities:  Assessment: Body structures, Functions, Activity limitations: Decreased functional mobility ,Decreased ROM,Decreased strength,Decreased endurance,Increased pain,Decreased posture  Assessment: No complaints of L shoulder pain since last visit. Pt continues to exhibit palpable knots in the L upper and middle trap reduced with manual techniques. Cervical mobility improving as scapular region knotted areas reduced and thoracic mobility increasing  Treatment Diagnosis: Cervical pain. Problem list:  1. Pain level 8/10 with movement   2. Decreased cervical ROM   3. Decreased cervical and R scapular strength    4. Decreased endurance causing difficulty in completing work tasks   5. Decreased ability to attain neutral posture.   6.  Neck disability 48%  Prognosis: Good  PT Education: Home Exercise Program  Patient Education: wall push ups and passive postioning of neck into extension supine with no pillow and sidelying with no pillow  Goals:  Short term goals  Time Frame for Short term goals: 7-8 visits  Short term goal 1: Pain level  will decrease to 4/10 with cervical mobility (met)  Short term goal 2: Pt will exhibit increased cervical ROM by > 5 deg in all planes (met)  Short term goal 3: Pt will attain neutral posture without manual cues in order to complete HEP correctly  Short term goal 4: Pt's neck disability index with decrease to less than 25%  Short term goal 5: Soft tissue restrictions at the upper traps will reduce to minimal    Long term goals  Time Frame for Long term goals : 16-18 visits  Long term goal 1: Pain level  will decrease to 2/10 with cervical mobility including neck movement while looking at computer and keyboard. Long term goal 2: Pt will exhibit increased cervical ROM to be WNL in all planes  Long term goal 3: Pt will exhibit 5/5 cervical strength in all planes and R scapular strength middle and lower traps to be 5/5  Long term goal 4: Pt will push a sled weighing 70 lbs without an increase in cervical pain  Long term goal 5: Pt will be independent with HEP  Progress toward goals:Strength and ROM    POST-PAIN       Pain Rating (0-10 pain scale):  1 /10   Location and pain description same as pre-treatment unless indicated. Action: [] NA   [] Perform HEP  [] Meds as prescribed  [] Modalities as prescribed   [] Call Physician     Frequency/Duration:  Plan  Times per week: 2-3  Plan weeks: 4-6 weeks  Current Treatment Recommendations: Strengthening,ROM,Functional Mobility Training,Neuromuscular Re-education,Manual Therapy - Soft Tissue Mobilization,Manual Therapy - Joint Manipulation,Home Exercise Program,Modalities,Integrated Dry Needling     Pt to continue current HEP. See objective section for any therapeutic exercise changes, additions or modifications this date.   PT Individual Minutes  Time In: 1721  Time Out: 1054  Minutes: 59  Timed Code Treatment Minutes: 59 Minutes  Procedure Minutes:0     Modality Time In Time Out Total Minutes Units    Ther ex ([id number]) [id number] 34 2   Manual Therapy ([id number]) 235 1431 25 2     Signature:  Electronically signed by Steve Gamino PT on 3/2/22 at 10:57 AM EST

## 2022-03-07 ENCOUNTER — HOSPITAL ENCOUNTER (OUTPATIENT)
Dept: PHYSICAL THERAPY | Age: 57
Setting detail: THERAPIES SERIES
Discharge: HOME | End: 2022-03-07
Payer: COMMERCIAL

## 2022-03-07 PROCEDURE — 97140 MANUAL THERAPY 1/> REGIONS: CPT

## 2022-03-07 PROCEDURE — 97110 THERAPEUTIC EXERCISES: CPT

## 2022-03-07 ASSESSMENT — PAIN DESCRIPTION - PAIN TYPE: TYPE: ACUTE PAIN

## 2022-03-07 ASSESSMENT — PAIN SCALES - GENERAL: PAINLEVEL_OUTOF10: 1

## 2022-03-07 ASSESSMENT — PAIN DESCRIPTION - LOCATION: LOCATION: NECK

## 2022-03-07 ASSESSMENT — PAIN DESCRIPTION - ORIENTATION: ORIENTATION: RIGHT

## 2022-03-07 NOTE — PROGRESS NOTES
[address] [facility]  Outpatient Physical Therapy    Treatment Note        Date: 3/7/2022  Patient: Daina Rodríguez  : 1965  ACCT #: [de-identified]  Referring Practitioner: Dr. Hunter Mcgraw  Diagnosis: cervical pain  Treatment Diagnosis: Cervical pain. Problem list:  1. Pain level 8/10 with movement   2. Decreased cervical ROM   3. Decreased cervical and R scapular strength    4. Decreased endurance causing difficulty in completing work tasks   5. Decreased ability to attain neutral posture. 6.  Neck disability 48%   Visit Information:  PT Visit Information  Onset Date: 22  PT Insurance Information: [facility]  Total # of Visits Approved: 18  Total # of Visits to Date:   Plan of Care/Certification Expiration Date: 22  Progress Note Counter:     Subjective: Pt reports she picked up a lightweight vacuum  and dropped it due to pain.  Pt reported her pain shot up to 6/10, but has reduced back to 1/10 per usual     HEP Compliance:  [] Good [] Fair [] Poor [] Reports not doing due to:  Vital Signs  Patient Currently in Pain: Yes   Pain Screening  Patient Currently in Pain: Yes  Pain Assessment  Pain Assessment: 0-10  Pain Level: 1  Pain Type: Acute pain  Pain Location: Neck  Pain Orientation: Right    OBJECTIVE:   Exercises  Exercise 1: active cervical flexion/extension within a pain free ROM  Exercise 2: active cervical lateral flexion within a pain free ROM  Exercise 3: active reach depressing each shoulder blade x 10 B in supine or unilateral in sidelying  Exercise 4: sitting or standing scapular retraction B (without compensation of trap elevation) using red theraband  Exercise 5: unilateral triceps pull backs with red theraband each side x 10  Exercise 6: prone middle trap x 10  B and unilateral progress to prone rows with 2 lbs  Exercise 7: prone lower trap x 10 B and prone triceps extension on each side  Exercise 8: Sidelying R UE horizontal abduction x 10  Exercise 9: supine positioning with rolled up towel horizontal at scapula level or supine with no pillow for passive cervical/thoracic cervical extension o  Exercise 10: standing diagonal pull downs in each direction  with red theraband resistance x 10  Exercise 11: standing scapular pull downs with elbows bent with red theraband resistance x 10  Exercise 12: standing scapular retraction with red theraband resistance x 10  Exercise 13: wall push ups x 10 and corner wall stretch 4 x 30 sec  Exercise 14: stand with back to wall with arms ER trying to touch wall  Exercise 15: *box lift from floor  Exercise 16: *push/pull cart  Exercise 20: HEP: wall corner pec stretch, back against wall stretch  Strength: [x] NT  [] MMT completed:   ROM: [] NT  [x] ROM measurements:      Manual:   Manual therapy  Soft Tissue Mobalization: performed at the B  cervical/thoracic paraspinals while in prone  Other: total manual 10min  Modalities:  Assessment: Body structures, Functions, Activity limitations: Decreased functional mobility ,Decreased ROM,Decreased strength,Decreased endurance,Increased pain,Decreased posture  Assessment: New exacerbation of pain with vacuum  lift attempt at work, but pt able to use stretching and scapular strengthening to assist with decreasing the pain. New pec stretchng added to assist with neutral cervical/thoracic alignment  Treatment Diagnosis: Cervical pain. Problem list:  1. Pain level 8/10 with movement   2. Decreased cervical ROM   3. Decreased cervical and R scapular strength    4. Decreased endurance causing difficulty in completing work tasks   5. Decreased ability to attain neutral posture.   6.  Neck disability 48%  Prognosis: Good    Goals:  Short term goals  Time Frame for Short term goals: 7-8 visits  Short term goal 1: Pain level  will decrease to 4/10 with cervical mobility (met)  Short term goal 2: Pt will exhibit increased cervical ROM by > 5 deg in all planes (met)  Short term goal 3: Pt will attain neutral posture without manual cues in order to complete HEP correctly  Short term goal 4: Pt's neck disability index with decrease to less than 25%  Short term goal 5: Soft tissue restrictions at the upper traps will reduce to minimal    Long term goals  Time Frame for Long term goals : 16-18 visits  Long term goal 1: Pain level  will decrease to 2/10 with cervical mobility including neck movement while looking at computer and keyboard. Long term goal 2: Pt will exhibit increased cervical ROM to be WNL in all planes  Long term goal 3: Pt will exhibit 5/5 cervical strength in all planes and R scapular strength middle and lower traps to be 5/5  Long term goal 4: Pt will push a sled weighing 70 lbs without an increase in cervical pain  Long term goal 5: Pt will be independent with HEP  Progress toward goals:  POST-PAIN       Pain Rating (0-10 pain scale):  1 /10   Location and pain description same as pre-treatment unless indicated. Action: [] NA   [x] Perform HEP  [] Meds as prescribed  [] Modalities as prescribed   [] Call Physician     Frequency/Duration:  Plan  Times per week: 2-3  Plan weeks: 4-6 weeks  Current Treatment Recommendations: Strengthening,ROM,Functional Mobility Training,Neuromuscular Re-education,Manual Therapy - Soft Tissue Mobilization,Manual Therapy - Joint Manipulation,Home Exercise Program,Modalities,Integrated Dry Needling     Pt to continue current HEP. See objective section for any therapeutic exercise changes, additions or modifications this date.      PT Individual Minutes  Time In: 1003  Time Out: 1059  Minutes: 56  Timed Code Treatment Minutes: 56 Minutes  Procedure Minutes:     Modality Time In Time Out Total Minutes Units    Ther ex ([id number]) [id number] 3   Manual Therapy [id number] 10 1     Signature:  Electronically signed by Antwon Loomis PT on 3/7/22 at 11:02 AM EST

## 2022-03-08 ENCOUNTER — HOSPITAL ENCOUNTER (EMERGENCY)
Age: 57
Discharge: HOME | End: 2022-03-08
Payer: COMMERCIAL

## 2022-03-08 ENCOUNTER — APPOINTMENT (OUTPATIENT)
Dept: CT IMAGING | Age: 57
End: 2022-03-08
Payer: COMMERCIAL

## 2022-03-08 VITALS
RESPIRATION RATE: 18 BRPM | BODY MASS INDEX: 23.37 KG/M2 | OXYGEN SATURATION: 97 % | WEIGHT: 127 LBS | HEART RATE: 76 BPM | SYSTOLIC BLOOD PRESSURE: 147 MMHG | HEIGHT: 62 IN | DIASTOLIC BLOOD PRESSURE: 94 MMHG | TEMPERATURE: 98.4 F

## 2022-03-08 DIAGNOSIS — H11.32 SUBCONJUNCTIVAL HEMORRHAGE OF LEFT EYE: Primary | ICD-10-CM

## 2022-03-08 PROCEDURE — 70450 CT HEAD/BRAIN W/O DYE: CPT

## 2022-03-08 PROCEDURE — 6370000000 HC RX 637 (ALT 250 FOR IP): Performed by: STUDENT IN AN ORGANIZED HEALTH CARE EDUCATION/TRAINING PROGRAM

## 2022-03-08 PROCEDURE — 99284 EMERGENCY DEPT VISIT MOD MDM: CPT

## 2022-03-08 RX ORDER — TETRACAINE HYDROCHLORIDE 5 MG/ML
1 SOLUTION OPHTHALMIC ONCE
Status: COMPLETED | OUTPATIENT
Start: 2022-03-08 | End: 2022-03-08

## 2022-03-08 RX ORDER — ONDANSETRON 4 MG/1
4 TABLET, ORALLY DISINTEGRATING ORAL ONCE
Status: COMPLETED | OUTPATIENT
Start: 2022-03-08 | End: 2022-03-08

## 2022-03-08 RX ORDER — ACETAMINOPHEN 500 MG
1000 TABLET ORAL ONCE
Status: COMPLETED | OUTPATIENT
Start: 2022-03-08 | End: 2022-03-08

## 2022-03-08 RX ADMIN — ACETAMINOPHEN 1000 MG: 500 TABLET ORAL at 10:09

## 2022-03-08 RX ADMIN — TETRACAINE HYDROCHLORIDE 1 DROP: 5 SOLUTION OPHTHALMIC at 10:10

## 2022-03-08 RX ADMIN — FLUORESCEIN SODIUM 1 MG: 1 STRIP OPHTHALMIC at 10:10

## 2022-03-08 RX ADMIN — ONDANSETRON 4 MG: 4 TABLET, ORALLY DISINTEGRATING ORAL at 10:10

## 2022-03-08 ASSESSMENT — ENCOUNTER SYMPTOMS
WHEEZING: 0
EYE ITCHING: 0
EYE REDNESS: 1
ABDOMINAL PAIN: 0
COUGH: 0
DIARRHEA: 0
NAUSEA: 0
PHOTOPHOBIA: 0
BLOOD IN STOOL: 0
CONSTIPATION: 0
EYE DISCHARGE: 1
SHORTNESS OF BREATH: 0
ABDOMINAL DISTENTION: 0
VOMITING: 0

## 2022-03-08 ASSESSMENT — VISUAL ACUITY: OU: 1

## 2022-03-08 ASSESSMENT — PAIN DESCRIPTION - LOCATION: LOCATION: EYE

## 2022-03-08 ASSESSMENT — PAIN DESCRIPTION - DESCRIPTORS: DESCRIPTORS: PINS AND NEEDLES

## 2022-03-08 ASSESSMENT — PAIN - FUNCTIONAL ASSESSMENT: PAIN_FUNCTIONAL_ASSESSMENT: 0-10

## 2022-03-08 ASSESSMENT — PAIN DESCRIPTION - FREQUENCY: FREQUENCY: INTERMITTENT

## 2022-03-08 ASSESSMENT — PAIN DESCRIPTION - PAIN TYPE: TYPE: ACUTE PAIN

## 2022-03-08 ASSESSMENT — PAIN SCALES - GENERAL: PAINLEVEL_OUTOF10: 8

## 2022-03-08 NOTE — ED TRIAGE NOTES
Pt states that has red in corner of left eye and has pain  Pt states started yesterday and has worsened  Pt states rubbed eye yesterday and felt better but work this morning with more pain and redness  Pt states has nausea and HA \"I think because of my eye\"  Pt is alert and oriented times 4

## 2022-03-08 NOTE — ED PROVIDER NOTES
[address] [facility] ED  EMERGENCY DEPARTMENT ENCOUNTER      Pt Name: Vlad Sena  MRN: [id number]  Catgfdex 1965  Date of evaluation: 3/8/2022  Provider: Cuco Nunes Dr       Chief Complaint   Patient presents with    Eye Problem     started yesterday         HISTORY OF PRESENT ILLNESS   (Location/Symptom, Timing/Onset, Context/Setting, Quality, Duration, Modifying Factors, Severity)  Note limiting factors. Vlad Sena is a 65 y.o. female who per chart review has pmhx of cervical spondylosis presents to the emergency department for evaluation of L eye redness that began yesterday evening. Pt states she was at physical therapy doing extensive exercises including the \"superman\" movement prior to sx onset. She states her therapist was rushed and feels she overdid it. Felt a lot of pressure in the neck and face region when doing the exercise. She woke up with redness in the medial corner of her left eye. States not really painful, just somewhat \"irritated\" feeling. Scant occasional clear drainage from the left eye. No blunt trauma to eye. Does not wear contacts. No visual changes. No FB introduction. States she has a mild associated headache and nausea because she is \"freaked out about her eye. \" has not tried anything for her sx. No hx of similar. She is not on blood thinners. Denies fever chills numbness tingling weakness facial droop confusion ear pain bleeding elsewhere photophobia. HPI    Nursing Notes were reviewed. REVIEW OF SYSTEMS    (2-9 systems for level 4, 10 or more for level 5)     Review of Systems   Constitutional: Negative for chills, fatigue and fever. HENT: Negative for congestion. Eyes: Positive for discharge and redness. Negative for photophobia, itching and visual disturbance. Respiratory: Negative for cough, shortness of breath and wheezing. Cardiovascular: Negative for chest pain, palpitations and leg swelling.    Gastrointestinal: Negative for abdominal distention, abdominal pain, blood in stool, constipation, diarrhea, nausea and vomiting. Genitourinary: Negative for dysuria, frequency and hematuria. Musculoskeletal: Negative for myalgias. Allergic/Immunologic: Negative for immunocompromised state. Neurological: Negative for dizziness, weakness and headaches. All other systems reviewed and are negative. Except as noted above the remainder of the review of systems was reviewed and negative. PAST MEDICAL HISTORY     Past Medical History:   Diagnosis Date    Cervical spondylosis without myelopathy 3/5/2021         SURGICAL HISTORY       Past Surgical History:   Procedure Laterality Date    CARPAL TUNNEL RELEASE      bilat    DILATION AND CURETTAGE OF UTERUS      TUBAL LIGATION           CURRENT MEDICATIONS       There are no discharge medications for this patient. ALLERGIES     Patient has no known allergies. FAMILY HISTORY     No family history on file. SOCIAL HISTORY       Social History     Socioeconomic History    Marital status: Single     Spouse name: Not on file    Number of children: Not on file    Years of education: Not on file    Highest education level: Not on file   Occupational History    Not on file   Tobacco Use    Smoking status: Current Every Day Smoker     Packs/day: 1.50     Years: 35.00     Pack years: 52.50     Types: Cigarettes    Smokeless tobacco: Never Used   Substance and Sexual Activity    Alcohol use: Yes     Comment: social    Drug use: No    Sexual activity: Yes   Other Topics Concern    Not on file   Social History Narrative    Not on file     Social Determinants of Health     Financial Resource Strain:     Difficulty of Paying Living Expenses: Not on file   Food Insecurity:     Worried About Running Out of Food in the Last Year: Not on file    Yue of Food in the Last Year: Not on file   Transportation Needs:     Lack of Transportation (Medical):  Not on file    Lack of Transportation (Non-Medical): Not on file   Physical Activity:     Days of Exercise per Week: Not on file    Minutes of Exercise per Session: Not on file   Stress:     Feeling of Stress : Not on file   Social Connections:     Frequency of Communication with Friends and Family: Not on file    Frequency of Social Gatherings with Friends and Family: Not on file    Attends Judaism Services: Not on file    Active Member of [address] [place] [address] or Organizations: Not on file    Attends Club or Organization Meetings: Not on file    Marital Status: Not on file   Intimate Partner Violence:     Fear of Current or Ex-Partner: Not on file    Emotionally Abused: Not on file    Physically Abused: Not on file    Sexually Abused: Not on file   Housing Stability:     Unable to Pay for Housing in the Last Year: Not on file    Number of [place] in the Last Year: Not on file    Unstable Housing in the Last Year: Not on file       SCREENINGS         Liang Coma Scale  Eye Opening: Spontaneous  Best Verbal Response: Oriented  Best Motor Response: Obeys commands  Liang Coma Scale Score: 15                     CIWA Assessment  BP: (!) 147/94  Pulse: 76                 PHYSICAL EXAM    (up to 7 for level 4, 8 or more for level 5)     ED Triage Vitals [03/08/22 0918]   BP Temp Temp Source Pulse Resp SpO2 Height Weight   (!) 147/94 98.4 °F (36.9 °C) Oral 76 18 97 % 5' 2\" (1.575 m) 127 lb (57.6 kg)       Physical Exam  Constitutional:       General: She is not in acute distress. Appearance: She is well-developed. She is not toxic-appearing. HENT:      Head: Normocephalic and atraumatic. Nose: Nose normal.      Mouth/Throat:      Mouth: Mucous membranes are moist.   Eyes:      General: Lids are normal. Vision grossly intact. Gaze aligned appropriately. Left eye: No foreign body, discharge or hordeolum. Extraocular Movements: Extraocular movements intact.       Conjunctiva/sclera:      Right eye: Right conjunctiva is not injected. No chemosis, exudate or hemorrhage. Left eye: Left conjunctiva is not injected. Hemorrhage present. No chemosis or exudate. Pupils: Pupils are equal, round, and reactive to light. Comments: Visual acuity is intact. No pain with eye movement. No scleral edema. No photophobia. No globe tenderness swelling or erythema. No scleral vessel dilation. Fluorescein stain negative, no evidence of corneal abrasion or other acute abnormality. Pupils are equal round and reactive. No temporal tenderness palpation or palpable cord. Cardiovascular:      Rate and Rhythm: Normal rate and regular rhythm. Heart sounds: No murmur heard. No friction rub. No gallop. Pulmonary:      Effort: Pulmonary effort is normal.      Breath sounds: Normal breath sounds. Abdominal:      General: There is no distension. Tenderness: There is no abdominal tenderness. Musculoskeletal:         General: No swelling. Cervical back: Full passive range of motion without pain, normal range of motion and neck supple. Skin:     General: Skin is warm and dry. Neurological:      General: No focal deficit present. Mental Status: She is alert and oriented to person, place, and time. GCS: GCS eye subscore is 4. GCS verbal subscore is 5. GCS motor subscore is 6. Cranial Nerves: Cranial nerves are intact. Sensory: Sensation is intact. Motor: Motor function is intact. Coordination: Coordination is intact. Gait: Gait is intact.       Comments: NIHSS 0         DIAGNOSTIC RESULTS     EKG: All EKG's are interpreted by the Emergency Department Physician who either signs or Co-signs this chart in the absence of a cardiologist.        RADIOLOGY:   Non-plain film images such as CT, Ultrasound and MRI are read by the radiologist. Plain radiographic images are visualized and preliminarily interpreted by the emergency physician with the below findings:      Interpretation per the Radiologist below, if available at the time of this note:    CT Head WO Contrast   Final Result   No acute intracranial hemorrhage or mass effect. Chronic left maxillary sinus disease. ED BEDSIDE ULTRASOUND:   Performed by ED Physician - none    LABS:  Labs Reviewed - No data to display    All other labs were within normal range or not returned as of this dictation. EMERGENCY DEPARTMENT COURSE and DIFFERENTIAL DIAGNOSIS/MDM:   Vitals:    Vitals:    03/08/22 0918   BP: (!) 147/94   Pulse: 76   Resp: 18   Temp: 98.4 °F (36.9 °C)   TempSrc: Oral   SpO2: 97%   Weight: 127 lb (57.6 kg)   Height: 5' 2\" (1.575 m)       MDM     Patient is a 26-year-old female who presents to the ED for evaluation of left eye redness. There is no eye pain. Visual acuity is intact. Ocular exam unremarkable. No evidence of corneal abrasion. CT head unremarkable. She is not on thinners, no bleeding elsewhere. No blunt trauma to the eye. Suspect subconjunctival hemorrhage 2/2 to valsalva maneuver while at PT yesterday. Less concern for episcleritis or scleritis. Pt provided standard anticipatory guidance regarding subconjunctival hemorrhage on discharge. She will call ophthalmology upon leaving the ED today to schedule f/u appt. Return to the ED for worsening sx, given warning signs for which should return. Pt understands and agrees to plan. REASSESSMENT          CRITICAL CARE TIME   Total Critical Care time was 0 minutes, excluding separately reportable procedures. There was a high probability of clinically significant/life threatening deterioration in the patient's condition which required my urgent intervention. CONSULTS:  None    PROCEDURES:  Unless otherwise noted below, none     Procedures        FINAL IMPRESSION      1.  Subconjunctival hemorrhage of left eye          DISPOSITION/PLAN   DISPOSITION Decision To Discharge 03/08/2022 10:49:50 AM      PATIENT REFERRED TO:  Annemarie Michelde, [address]  [phone]    Schedule an appointment as soon as possible for a visit in 1 day      [facility]) ED  [address] [facility] [address] [phone]  Go to   As needed, If symptoms worsen    [facility]  [address]  Schedule an appointment as soon as possible for a visit in 1 day  [phone]      DISCHARGE MEDICATIONS:  There are no discharge medications for this patient. Controlled Substances Monitoring:     No flowsheet data found.     (Please note that portions of this note were completed with a voice recognition program.  Efforts were made to edit the dictations but occasionally words are mis-transcribed.)    Nikunj Mc PA-C (electronically signed)             Nikunj Mc PA-C  03/08/22 [id number]

## 2022-03-09 ENCOUNTER — HOSPITAL ENCOUNTER (OUTPATIENT)
Dept: PHYSICAL THERAPY | Age: 57
Setting detail: THERAPIES SERIES
Discharge: HOME | End: 2022-03-09
Payer: COMMERCIAL

## 2022-03-09 NOTE — PROGRESS NOTES
Therapy                            Cancellation/No-show Note      Date:  3/9/2022  Patient Name:  Jarvis Freeman  :  1965   MRN:  [id number]  Referring Practitioner: Dr. Cristela Bautista  Diagnosis: cervical pain    Visit Information:  PT Visit Information  Onset Date: 22  PT Insurance Information: [facility]  Total # of Visits Approved: 18  Total # of Visits to Date: 14  No Show: 0  Canceled Appointment: 2  Progress Note Counter:     For today's appointment patient:  [x]  Cancelled  []  Rescheduled appointment  []  No-show   []  Called pt to remind of next appointment     Reason given by patient:  [x]  Patient ill  []  Conflicting appointment  []  No transportation    []  Conflict with work  []  No reason given  []  Other:      [x] Pt has future appointments scheduled, no follow up needed  [] Pt requests to be on hold.     Reason:   If > 2 weeks please discuss with therapist.  [] Therapist to call pt for follow up     Comments:       Signature: Electronically signed by Chelo Antonio PT on 3/9/22 at 7:52 AM EST

## 2022-03-14 ENCOUNTER — HOSPITAL ENCOUNTER (OUTPATIENT)
Dept: PHYSICAL THERAPY | Age: 57
Setting detail: THERAPIES SERIES
Discharge: HOME | End: 2022-03-14
Payer: COMMERCIAL

## 2022-03-14 NOTE — PROGRESS NOTES
[address]  PHYSICAL THERAPY PLAN OF CARE   [address], [id number] [place] [address]         Ph: [phone]  Fax: [phone]    [] Certification  [] Recertification []  Plan of Care  [] Progress Note [x] Discharge      To:  Dr. Kelli Roy      From:  Gerson Menno, PT  Patient: Melissa Burgos     : 1965  Diagnosis: cervical pain     Date: 3/14/2022  Treatment Diagnosis: Cervical pain. Problem list:  1. Pain level 8/10 with movement   2. Decreased cervical ROM   3. Decreased cervical and R scapular strength    4. Decreased endurance causing difficulty in completing work tasks   5. Decreased ability to attain neutral posture. 6.  Neck disability 48%    Plan of Care/Certification Expiration Date: 22  Progress Report Period from:  2022  to 3/14/2022    Total # of Visits to Date: 14   No Show: 0    Canceled Appointment: 3     OBJECTIVE:   Short Term Goals - Time Frame for Short term goals: 7-8 visits    Goals Current/Discharge status  Met   Short term goal 1: Pain level  will decrease to 4/10 with cervical mobility (met)  1/10 [x] yes  [] no   Short term goal 2: Pt will exhibit increased cervical ROM by > 5 deg in all planes (met)   FL 35, Ext 40, Rotation R 55, L 60, lateral flexion 18, L 30         [] yes  [x] no   Short term goal 3: Pt will attain neutral posture without manual cues in order to complete HEP correctly  Required inconsistent manual cues [] yes  [x] no   Short term goal 4: Pt's neck disability index with decrease to less than 25%  NT due to unplanned D/C [] yes  [x] no   Short term goal 5: Soft tissue restrictions at the upper traps will reduce to minimal Minimal to moderate []  yes  [x]  no     Long Term Goals - Time Frame for Long term goals : 16-18 visits  Goals Current/ Discharge status Met   Long term goal 1: Pain level  will decrease to 2/10 with cervical mobility including neck movement while looking at computer and keyboard.  Pain level reduced to 1/10 [x] yes  [] no   Long term goal 2: Pt will exhibit increased cervical ROM to be WNL in all planes ROM as above [] yes  [x] no   Long term goal 3: Pt will exhibit 5/5 cervical strength in all planes and R scapular strength middle and lower traps to be 5/5 Cervical flexion 4/5, exten 4+/5, rotation each side 4/5, lateral flexion each side 4/5 as of 3/2/2022 [] yes  [x] no   Long term goal 4: Pt will push a sled weighing 70 lbs without an increase in cervical pain NA [] yes  [x] no   Long term goal 5: Pt will be independent with HEP Pt demonstrated proficiency [x] yes  [] no      Assessment: Pt had been making good gains progressing all goals. However, I spoke with patient on the phone and she states she had increased pressure in her head that caused hemorrhaging in her eye that required medical attention. She states she feels it was from completion of an exercise she completed during treatment on 3/7/2022 despite not having made mention of this to me or to therapy dept until phone call I initiated today. She states she is afraid to return to PT and agreed to be discharged. Prognosis: Good     PLAN: [x]                   Patient Status:[] Continue/ Initiate plan of Care    [x] Discharge PT. Recommend pt continue with HEP. [] Additional visits requested, Please re-certify for additional visits:          Signature: Electronically signed by Eboni Mi PT on 3/14/22 at 11:01 AM EDT    If you have any questions or concerns, please don't hesitate to call. Thank you for your referral.    I have reviewed this plan of care and certify a need for medically necessary rehabilitation services.     Physician Signature:__________________________________________________________  Date:  Please sign and return

## 2022-03-14 NOTE — PROGRESS NOTES
Therapy                            Cancellation/No-show Note      Date:  3/14/2022  Patient Name:  Antwan Jay  :  1965   MRN:  [id number]  Referring Practitioner: Dr. Tomasa Gimenez  Diagnosis: cervical pain    Visit Information:  PT Visit Information  Onset Date: 22  PT Insurance Information: [facility]  Total # of Visits Approved: 18  Total # of Visits to Date: 14  No Show: 0  Canceled Appointment: 3  Progress Note Counter:     For today's appointment patient:  [x]  Cancelled  []  Rescheduled appointment  []  No-show   []  Called pt to remind of next appointment     Reason given by patient:  []  Patient ill  []  Conflicting appointment  []  No transportation    []  Conflict with work  []  No reason given  [x]  Other:  See below (comments)  [] Pt has future appointments scheduled, no follow up needed  [] Pt requests to be on hold. Reason:   If > 2 weeks please discuss with therapist.  [] Therapist to call pt for follow up     Comments: Followed up with patient via phone call regarding canceled visits. Pt very upset stating that an exercise I had her perform on visit date of 3/7/2022 caused too much pressure in her head causing a blood vessel in her eye to break. She states she sought out medical care for it and is now too afraid to come back to PT. I apologized to patient regarding her eye and restated the purpose of the exercises she was performing. I asked her what she wanted to do. Pt reports she no longer wants to come back, therefore, will D/C from PT services.     Signature: Electronically signed by Alejandro Basilio PT on 3/14/22 at 10:49 AM EDT

## 2022-03-16 ENCOUNTER — APPOINTMENT (OUTPATIENT)
Dept: PHYSICAL THERAPY | Age: 57
End: 2022-03-16
Payer: COMMERCIAL

## 2022-04-16 ENCOUNTER — APPOINTMENT (OUTPATIENT)
Dept: GENERAL RADIOLOGY | Age: 57
End: 2022-04-16
Payer: OTHER MISCELLANEOUS

## 2022-04-16 ENCOUNTER — HOSPITAL ENCOUNTER (EMERGENCY)
Age: 57
Discharge: HOME | End: 2022-04-17
Payer: OTHER MISCELLANEOUS

## 2022-04-16 VITALS
HEIGHT: 62 IN | RESPIRATION RATE: 18 BRPM | OXYGEN SATURATION: 98 % | SYSTOLIC BLOOD PRESSURE: 117 MMHG | WEIGHT: 129 LBS | TEMPERATURE: 98.5 F | DIASTOLIC BLOOD PRESSURE: 54 MMHG | HEART RATE: 81 BPM | BODY MASS INDEX: 23.74 KG/M2

## 2022-04-16 DIAGNOSIS — S80.10XA CONTUSION OF MULTIPLE SITES OF LOWER EXTREMITY, UNSPECIFIED LATERALITY, INITIAL ENCOUNTER: ICD-10-CM
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
## 2022-04-16 DIAGNOSIS — S20.212A CONTUSION OF LEFT CHEST WALL, INITIAL ENCOUNTER: ICD-10-CM

## 2022-04-16 DIAGNOSIS — S60.222A CONTUSION OF LEFT HAND, INITIAL ENCOUNTER: ICD-10-CM

## 2022-04-16 PROCEDURE — 73130 X-RAY EXAM OF HAND: CPT

## 2022-04-16 PROCEDURE — 71046 X-RAY EXAM CHEST 2 VIEWS: CPT

## 2022-04-16 PROCEDURE — 99283 EMERGENCY DEPT VISIT LOW MDM: CPT

## 2022-04-16 PROCEDURE — 73590 X-RAY EXAM OF LOWER LEG: CPT

## 2022-04-16 ASSESSMENT — PAIN DESCRIPTION - FREQUENCY: FREQUENCY: CONTINUOUS

## 2022-04-16 ASSESSMENT — PAIN - FUNCTIONAL ASSESSMENT: PAIN_FUNCTIONAL_ASSESSMENT: 0-10

## 2022-04-16 ASSESSMENT — PAIN SCALES - GENERAL: PAINLEVEL_OUTOF10: 8

## 2022-04-16 ASSESSMENT — PAIN DESCRIPTION - PAIN TYPE: TYPE: ACUTE PAIN

## 2022-04-16 ASSESSMENT — PAIN DESCRIPTION - DESCRIPTORS: DESCRIPTORS: CONSTANT;BURNING;ACHING

## 2022-04-16 NOTE — Clinical Note
Jaimie Martinez was seen and treated in our emergency department on 4/16/2022. She may return to work on 04/19/2022. If you have any questions or concerns, please don't hesitate to call.       Danette Sandoval PA-C

## 2022-04-17 RX ORDER — CYCLOBENZAPRINE HCL 10 MG
10 TABLET ORAL 3 TIMES DAILY PRN
Qty: 21 TABLET | Refills: 0 | Status: SHIPPED | OUTPATIENT
Start: 2022-04-17 | End: 2022-04-27

## 2022-04-17 RX ORDER — ETODOLAC 400 MG/1
400 TABLET, FILM COATED ORAL 2 TIMES DAILY
Qty: 14 TABLET | Refills: 0 | Status: SHIPPED | OUTPATIENT
Start: 2022-04-17

## 2022-04-17 ASSESSMENT — ENCOUNTER SYMPTOMS
EYE PAIN: 0
ALLERGIC/IMMUNOLOGIC NEGATIVE: 1
TROUBLE SWALLOWING: 0
SHORTNESS OF BREATH: 0
COLOR CHANGE: 0
APNEA: 0
ABDOMINAL PAIN: 0
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
## 2022-04-17 NOTE — ED NOTES
Pt understands discharge instructions.   Pt instructed to follow up with PCP   Prescriptions explained   Pt told to come back for new or worsening symptoms  No further questions        Wrist brace applied   Pt understands directions      Sadie Mendoza RN  04/17/22 [id number]

## 2023-01-08 ENCOUNTER — HOSPITAL ENCOUNTER (EMERGENCY)
Age: 58
Discharge: HOME | End: 2023-01-08
Payer: COMMERCIAL

## 2023-01-08 ENCOUNTER — APPOINTMENT (OUTPATIENT)
Dept: GENERAL RADIOLOGY | Age: 58
End: 2023-01-08
Payer: COMMERCIAL

## 2023-01-08 VITALS
DIASTOLIC BLOOD PRESSURE: 74 MMHG | SYSTOLIC BLOOD PRESSURE: 130 MMHG | RESPIRATION RATE: 14 BRPM | WEIGHT: 130 LBS | TEMPERATURE: 98 F | BODY MASS INDEX: 23.04 KG/M2 | HEIGHT: 63 IN | HEART RATE: 68 BPM

## 2023-01-08 DIAGNOSIS — S80.01XA CONTUSION OF RIGHT KNEE, INITIAL ENCOUNTER: ICD-10-CM
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
## 2023-01-08 PROCEDURE — 73560 X-RAY EXAM OF KNEE 1 OR 2: CPT

## 2023-01-08 PROCEDURE — 99283 EMERGENCY DEPT VISIT LOW MDM: CPT

## 2023-01-08 RX ORDER — NAPROXEN 250 MG/1
250 TABLET ORAL 2 TIMES DAILY WITH MEALS
Qty: 30 TABLET | Refills: 0 | Status: SHIPPED | OUTPATIENT
Start: 2023-01-08

## 2023-01-08 RX ORDER — NAPROXEN 250 MG/1
250 TABLET ORAL 2 TIMES DAILY WITH MEALS
Qty: 30 TABLET | Refills: 0 | Status: SHIPPED | OUTPATIENT
Start: 2023-01-08 | End: 2023-01-08 | Stop reason: SDUPTHER

## 2023-01-08 ASSESSMENT — ENCOUNTER SYMPTOMS
ABDOMINAL DISTENTION: 0
SORE THROAT: 0
VOMITING: 0
RHINORRHEA: 0
ABDOMINAL PAIN: 0
NAUSEA: 0
CHOKING: 0
SHORTNESS OF BREATH: 0
DIARRHEA: 0
COUGH: 0

## 2023-01-08 ASSESSMENT — PAIN DESCRIPTION - LOCATION: LOCATION: LEG

## 2023-01-08 ASSESSMENT — PAIN SCALES - GENERAL: PAINLEVEL_OUTOF10: 7

## 2023-01-08 ASSESSMENT — PAIN - FUNCTIONAL ASSESSMENT: PAIN_FUNCTIONAL_ASSESSMENT: 0-10

## 2023-01-08 ASSESSMENT — PAIN DESCRIPTION - DESCRIPTORS: DESCRIPTORS: BURNING

## 2023-01-09 NOTE — ED NOTES
Pt understands discharge instructions.   Pt instructed to follow up with PCP   Prescriptions explained   Pt told to come back for new or worsening symptoms  No further questions         Trinity Berrios RN  01/08/23 7291

## 2023-01-09 NOTE — ED NOTES
Pt presented to the ER with bilateral leg pain after falling. Pt denies blood thinners or LOC.      Alex Sesay, EMT-P  01/08/23 2021

## 2023-01-09 NOTE — ED PROVIDER NOTES
[address] [facility] ED  eMERGENCYdEPARTMENT eNCOUnter      Pt Name: Lainey Marinelli  MRN: [id number]  [place] 1965  Date of evaluation: 1/8/2023  Bria Arroyo PA-C    CHIEF COMPLAINT           HPI  Lainey Marinelli is a 65 y.o. female per chart review has a h/o carpal tunnel surgery presents to the ED with complaints of bilateral knee pain that began yesterday. Patient states she tripped and fell onto her knees. States her left knee is mildly swollen, pain with ambulation. However patient is able to ambulate without any issues. No unsteady gait, no bearing weight more on the right side. .  Right knee is relatively benign. She has no ecchymosis, laceration or wounds. Has not taken anything for pain today. ROS  Review of Systems   Constitutional:  Negative for chills, fatigue and fever. HENT:  Negative for congestion, rhinorrhea, sneezing and sore throat. Respiratory:  Negative for cough, choking and shortness of breath. Cardiovascular:  Negative for chest pain. Gastrointestinal:  Negative for abdominal distention, abdominal pain, diarrhea, nausea and vomiting. Genitourinary:  Negative for dysuria, flank pain, hematuria and urgency. Musculoskeletal:  Positive for arthralgias, joint swelling and myalgias. Skin: Negative. Neurological:  Negative for weakness and headaches. Except as noted above the remainder of the review of systems was reviewed and negative. PAST MEDICAL HISTORY     Past Medical History:   Diagnosis Date    Cervical spondylosis without myelopathy 3/5/2021         SURGICAL HISTORY       Past Surgical History:   Procedure Laterality Date    CARPAL TUNNEL RELEASE      bilat    DILATION AND CURETTAGE OF UTERUS      TUBAL LIGATION           CURRENTMEDICATIONS       Previous Medications    ETODOLAC (LODINE) 400 MG TABLET    Take 1 tablet by mouth 2 times daily       ALLERGIES     Patient has no known allergies. FAMILY HISTORY     No family history on file. SOCIAL HISTORY       Social History     Socioeconomic History    Marital status: Single   Tobacco Use    Smoking status: Every Day     Packs/day: 0.50     Years: 35.00     Pack years: 17.50     Types: Cigarettes    Smokeless tobacco: Never   Substance and Sexual Activity    Alcohol use: Yes     Comment: social    Drug use: No    Sexual activity: Yes         PHYSICAL EXAM       ED Triage Vitals [01/08/23 2016]   BP Temp Temp Source Heart Rate Resp SpO2 Height Weight   130/74 98 °F (36.7 °C) Oral 68 14 -- 5' 3\" (1.6 m) 130 lb (59 kg)       Physical Exam  Constitutional:       General: She is not in acute distress. Appearance: Normal appearance. She is not ill-appearing, toxic-appearing or diaphoretic. HENT:      Head: Normocephalic. Right Ear: Ear canal and external ear normal.      Left Ear: Ear canal and external ear normal.      Nose: Nose normal.      Mouth/Throat:      Mouth: Mucous membranes are moist.      Pharynx: Oropharynx is clear. Eyes:      Extraocular Movements: Extraocular movements intact. Conjunctiva/sclera: Conjunctivae normal.      Pupils: Pupils are equal, round, and reactive to light. Cardiovascular:      Rate and Rhythm: Normal rate and regular rhythm. Pulmonary:      Effort: Pulmonary effort is normal. No respiratory distress. Breath sounds: Normal breath sounds. Abdominal:      General: There is no distension. Tenderness: There is no abdominal tenderness. Musculoskeletal:         General: Normal range of motion. Cervical back: Normal.      Thoracic back: Normal.      Lumbar back: Normal.      Right upper leg: Normal.      Left upper leg: Normal.      Right knee: Swelling present. No effusion, erythema, ecchymosis or lacerations. Normal range of motion. Tenderness present over the lateral joint line. No LCL laxity, MCL laxity, ACL laxity or PCL laxity. Normal alignment, normal meniscus and normal patellar mobility. Normal pulse.       Left knee: Normal.      Comments: Right knee has trace swelling in comparison to the left knee. Tenderness to palpation on the lateral joint line. No anterior or posterior knee pain. Skin:     General: Skin is warm and dry. Capillary Refill: Capillary refill takes 2 to 3 seconds. Findings: No bruising, lesion or rash. Neurological:      General: No focal deficit present. Mental Status: She is alert and oriented to person, place, and time. Mental status is at baseline. Cranial Nerves: No cranial nerve deficit. Motor: No weakness. Gait: Gait normal.         MDM    81-year-old female presents to the ED with complaints of bilateral knee pain, worse on the right side after injury yesterday. Patient states she was at work, tripped and fell onto her knees. Pain has worsened today, reports right knee swelling. Able to ambulate without any issues, no unsteady gait. Tolerating p.o., afebrile and hemodynamically stable upon arrival to the ED. Remarkable exam findings noted above. Mild tenderness of the lateral joint line. She has full ROM, 5/5 strength. No laxity. Neurovascularly intact. X-ray reviewed by myself, negative for any bony abnormalities. Will treat symptomatically for a knee strain versus contusion. Prescribed naproxen as needed for pain management. Patient will follow-up as needed with Dr. Betsy Burgos. Gave warning precautions and when to return to the ED immediately. She understands and agrees with this plan and is without further questions          FINAL IMPRESSION      1. Fall, initial encounter    2.  Contusion of right knee, initial encounter          DISPOSITION/PLAN   DISPOSITION Decision To Discharge 01/08/2023 08:43:59 PM        DISCHARGE MEDICATIONS:  [unfilled]         Gus Osuna PA-C(electronically signed)  Attending Emergency Physician           Gus Osuna PA-C  01/08/23 2053

## 2023-02-12 ENCOUNTER — HOSPITAL ENCOUNTER (EMERGENCY)
Age: 58
Discharge: HOME | End: 2023-02-12
Payer: COMMERCIAL

## 2023-02-12 ENCOUNTER — APPOINTMENT (OUTPATIENT)
Dept: GENERAL RADIOLOGY | Age: 58
End: 2023-02-12
Payer: COMMERCIAL

## 2023-02-12 VITALS
DIASTOLIC BLOOD PRESSURE: 74 MMHG | WEIGHT: 129 LBS | HEIGHT: 63 IN | BODY MASS INDEX: 22.86 KG/M2 | HEART RATE: 75 BPM | OXYGEN SATURATION: 98 % | SYSTOLIC BLOOD PRESSURE: 116 MMHG | TEMPERATURE: 98.8 F | RESPIRATION RATE: 16 BRPM

## 2023-02-12 DIAGNOSIS — M25.562 ACUTE PAIN OF BOTH KNEES: ICD-10-CM
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
## 2023-02-12 DIAGNOSIS — M25.522 LEFT ELBOW PAIN: ICD-10-CM

## 2023-02-12 DIAGNOSIS — M25.561 ACUTE PAIN OF BOTH KNEES: ICD-10-CM

## 2023-02-12 PROCEDURE — 73564 X-RAY EXAM KNEE 4 OR MORE: CPT

## 2023-02-12 PROCEDURE — 96372 THER/PROPH/DIAG INJ SC/IM: CPT

## 2023-02-12 PROCEDURE — 73080 X-RAY EXAM OF ELBOW: CPT

## 2023-02-12 PROCEDURE — 6360000002 HC RX W HCPCS

## 2023-02-12 PROCEDURE — 99285 EMERGENCY DEPT VISIT HI MDM: CPT

## 2023-02-12 RX ORDER — CYCLOBENZAPRINE HCL 10 MG
10 TABLET ORAL 3 TIMES DAILY PRN
COMMUNITY

## 2023-02-12 RX ORDER — IBUPROFEN 600 MG/1
600 TABLET ORAL 2 TIMES DAILY PRN
Qty: 30 TABLET | Refills: 0 | Status: SHIPPED | OUTPATIENT
Start: 2023-02-12

## 2023-02-12 RX ORDER — KETOROLAC TROMETHAMINE 30 MG/ML
30 INJECTION, SOLUTION INTRAMUSCULAR; INTRAVENOUS ONCE
Status: COMPLETED | OUTPATIENT
Start: 2023-02-12 | End: 2023-02-12

## 2023-02-12 RX ADMIN — KETOROLAC TROMETHAMINE 30 MG: 30 INJECTION, SOLUTION INTRAMUSCULAR; INTRAVENOUS at 17:15

## 2023-02-12 ASSESSMENT — PAIN DESCRIPTION - ORIENTATION: ORIENTATION: RIGHT;LEFT

## 2023-02-12 ASSESSMENT — ENCOUNTER SYMPTOMS
EYE PAIN: 0
EYE ITCHING: 0
EYE DISCHARGE: 0
ALLERGIC/IMMUNOLOGIC NEGATIVE: 1
ABDOMINAL PAIN: 0
VOMITING: 0
COUGH: 0
DIARRHEA: 0
SHORTNESS OF BREATH: 0
NAUSEA: 0
CONSTIPATION: 0

## 2023-02-12 ASSESSMENT — LIFESTYLE VARIABLES
HOW MANY STANDARD DRINKS CONTAINING ALCOHOL DO YOU HAVE ON A TYPICAL DAY: 1 OR 2
HOW OFTEN DO YOU HAVE A DRINK CONTAINING ALCOHOL: 2-4 TIMES A MONTH
HOW MANY STANDARD DRINKS CONTAINING ALCOHOL DO YOU HAVE ON A TYPICAL DAY: 1 OR 2
HOW OFTEN DO YOU HAVE A DRINK CONTAINING ALCOHOL: 2-4 TIMES A MONTH

## 2023-02-12 ASSESSMENT — PAIN SCALES - GENERAL
PAINLEVEL_OUTOF10: 8
PAINLEVEL_OUTOF10: 8

## 2023-02-12 ASSESSMENT — PAIN DESCRIPTION - PAIN TYPE: TYPE: ACUTE PAIN

## 2023-02-12 ASSESSMENT — PAIN DESCRIPTION - DESCRIPTORS
DESCRIPTORS: BURNING;PRESSURE
DESCRIPTORS_2: ACHING;DULL

## 2023-02-12 ASSESSMENT — PAIN DESCRIPTION - LOCATION
LOCATION: KNEE
LOCATION_2: ELBOW

## 2023-02-12 ASSESSMENT — PAIN DESCRIPTION - INTENSITY: RATING_2: 2

## 2023-02-12 ASSESSMENT — PAIN - FUNCTIONAL ASSESSMENT: PAIN_FUNCTIONAL_ASSESSMENT: 0-10

## 2023-02-12 NOTE — ED PROVIDER NOTES
[address] [facility] ED  eMERGENCY dEPARTMENT eNCOUnter      Pt Name: Uli Magana  MRN: [id number]  [place] 1965  Date of evaluation: 2/12/2023  Provider: IMMANUEL Simon        HISTORY OF PRESENT ILLNESS    Uli Magana is a 65 y.o. female per chart review has a PMHx of cervical spondylosis presents ED for evaluation following fall. Patient reports that she tripped and fell on a dust pan while at work yesterday at approximately 1000. Patient reports that she landed on her hands and knees. Patient denies hitting her head. Denies LOC, anticoagulation. Reports that since fall, she has been experiencing bilateral knee pain, pain to her left elbow. Patient does have a small abrasion to her left elbow. Patient reports that she has been ambulatory since fall, however reports increased pain with ambulation. Patient reports that this is a Workmen's Compensation related injury. Patient denies chest pain, shortness of breath, N/V/D, fever, chills. REVIEW OF SYSTEMS       Review of Systems   Constitutional:  Negative for activity change, appetite change, chills, fatigue and fever. HENT:  Negative for congestion. Eyes:  Negative for pain, discharge and itching. Respiratory:  Negative for cough and shortness of breath. Cardiovascular:  Negative for chest pain, palpitations and leg swelling. Gastrointestinal:  Negative for abdominal pain, constipation, diarrhea, nausea and vomiting. Endocrine: Negative for polydipsia, polyphagia and polyuria. Genitourinary:  Negative for difficulty urinating and dysuria. Musculoskeletal:  Positive for arthralgias. Negative for myalgias. Skin:  Negative for rash and wound. Allergic/Immunologic: Negative. Neurological:  Negative for light-headedness and headaches. Hematological:  Negative for adenopathy. Does not bruise/bleed easily. Psychiatric/Behavioral: Negative.        Except as noted above the remainder of the review of systems was reviewed and negative. PAST MEDICAL HISTORY     Past Medical History:   Diagnosis Date    Cervical spondylosis without myelopathy 3/5/2021         SURGICAL HISTORY       Past Surgical History:   Procedure Laterality Date    CARPAL TUNNEL RELEASE      bilat    DILATION AND CURETTAGE OF UTERUS      TUBAL LIGATION           CURRENT MEDICATIONS       Previous Medications    CYCLOBENZAPRINE (FLEXERIL) 10 MG TABLET    Take 10 mg by mouth 3 times daily as needed for Muscle spasms Took 20mg at midnight last night    NAPROXEN (NAPROSYN) 250 MG TABLET    Take 1 tablet by mouth 2 times daily (with meals)       ALLERGIES     Patient has no known allergies. FAMILY HISTORY     History reviewed. No pertinent family history. SOCIAL HISTORY       Social History     Socioeconomic History    Marital status: Single     Spouse name: None    Number of children: None    Years of education: None    Highest education level: None   Tobacco Use    Smoking status: Every Day     Packs/day: 0.50     Years: 35.00     Pack years: 17.50     Types: Cigarettes    Smokeless tobacco: Never   Vaping Use    Vaping Use: Never used   Substance and Sexual Activity    Alcohol use: Yes     Comment: social    Drug use: No    Sexual activity: Yes         PHYSICAL EXAM        ED Triage Vitals [02/12/23 1642]   BP Temp Temp Source Heart Rate Resp SpO2 Height Weight   116/74 98.8 °F (37.1 °C) Oral 75 16 98 % 5' 3\" (1.6 m) 129 lb (58.5 kg)       Physical Exam  Vitals and nursing note reviewed. Constitutional:       General: She is not in acute distress. Appearance: Normal appearance. She is normal weight. She is not ill-appearing or toxic-appearing. HENT:      Head: Normocephalic and atraumatic. Right Ear: External ear normal.      Left Ear: External ear normal.      Nose: Nose normal.      Mouth/Throat:      Mouth: Mucous membranes are moist.      Pharynx: Oropharynx is clear. Eyes:      Extraocular Movements: Extraocular movements intact. Pupils: Pupils are equal, round, and reactive to light. Cardiovascular:      Rate and Rhythm: Normal rate and regular rhythm. Pulses: Normal pulses. Heart sounds: Normal heart sounds. Pulmonary:      Effort: Pulmonary effort is normal.      Breath sounds: Normal breath sounds. Abdominal:      General: Abdomen is flat. Palpations: Abdomen is soft. Musculoskeletal:         General: Tenderness present. Normal range of motion. Cervical back: Normal range of motion and neck supple. Comments: Bony tenderness on palpation of bilateral knees. There is no deformity, bruising, edema noted. Full ROM. Strength 5/5, bilaterally. DP +2, bilaterally. PT +2, bilaterally. Skin:     General: Skin is warm and dry. Capillary Refill: Capillary refill takes less than 2 seconds. Findings: Abrasion present. Neurological:      General: No focal deficit present. Mental Status: She is alert and oriented to person, place, and time. Mental status is at baseline. Psychiatric:         Mood and Affect: Mood normal.         LABS:  Labs Reviewed - No data to display      MDM:   Vitals:    Vitals:    02/12/23 1642   BP: 116/74   Pulse: 75   Resp: 16   Temp: 98.8 °F (37.1 °C)   TempSrc: Oral   SpO2: 98%   Weight: 129 lb (58.5 kg)   Height: 5' 3\" (1.6 m)       60-year-old female presents to ED for evaluation following fall. Patient is afebrile, hemodynamically stable, nontoxic. Patient given IM Toradol while in ED. X-ray left knee per radiologist interpretation demonstrates no acute osseous abnormality, or malalignment. X-ray right knee per radiologist interpretation demonstrates no acute osseous abnormality, or malalignment. X-ray left elbow per radiologist interpretation demonstrates no acute abnormality. Ace wrap applied to left knee and right knee for comfort. Offered Ace bandage to be applied to left elbow and patient declined. LLE, RLE, LUE neurovascularly intact.   Patient given follow-up information for [facility] occupational health, Dr. Shayla Gabriel (ortho). Patient given prescription for ibuprofen. Patient educated on supportive care. Patient given standard anticipatory guidance, return to ED warning signs, strict follow-up guidelines with occupational health, Ortho. Patient verbalized understanding of education, instruction. Patient is agreeable to plan. Patient discharged home in stable condition. CRITICAL CARE TIME   Total CriticalCare time was 0 minutes, excluding separately reportable procedures. There was a high probability of clinically significant/life threatening deterioration in the patient's condition which required my urgent intervention. PROCEDURES:  Unlessotherwise noted below, none      Procedures      FINAL IMPRESSION      1. Fall, initial encounter    2. Acute pain of both knees    3.  Left elbow pain          DISPOSITION/PLAN   DISPOSITION Decision To Discharge 02/12/2023 05:25:43 PM          IMMANUEL Simon (electronically signed)  Attending Emergency Physician         IMMANUEL Simon  02/12/23 [phone]

## 2023-02-12 NOTE — Clinical Note
Kiko Nath was seen and treated in our emergency department on 2/12/2023. She may return to school on 02/14/2023. If you have any questions or concerns, please don't hesitate to call.       RENA BethC

## 2023-02-12 NOTE — ED NOTES
Called our lab for workers [employer]. They transferred me to [facility] lab who transferred me to their hospital supervisor. Per supervisor, since injury was yesterday, she needs to follow up tomorrow with [facility]. Pt made aware.      Brionna Hager RN  02/12/23 0048

## 2023-03-13 ENCOUNTER — HOSPITAL ENCOUNTER (OUTPATIENT)
Dept: PHYSICAL THERAPY | Age: 58
Setting detail: THERAPIES SERIES
Discharge: HOME | End: 2023-03-13
Payer: COMMERCIAL

## 2023-03-13 PROCEDURE — 97110 THERAPEUTIC EXERCISES: CPT

## 2023-03-13 PROCEDURE — 97162 PT EVAL MOD COMPLEX 30 MIN: CPT

## 2023-03-13 ASSESSMENT — PAIN DESCRIPTION - ORIENTATION: ORIENTATION: RIGHT;LEFT

## 2023-03-13 ASSESSMENT — PAIN SCALES - GENERAL: PAINLEVEL_OUTOF10: 5

## 2023-03-13 ASSESSMENT — PAIN DESCRIPTION - LOCATION: LOCATION: KNEE

## 2023-03-13 ASSESSMENT — PAIN DESCRIPTION - PAIN TYPE: TYPE: ACUTE PAIN

## 2023-03-13 ASSESSMENT — PAIN DESCRIPTION - DESCRIPTORS: DESCRIPTORS: SHARP;BURNING

## 2023-03-13 NOTE — PROGRESS NOTES
[facility]  PHYSICAL THERAPY EVALUATION      Physical Therapy: Initial Evaluation    Patient: Gonzalo Ibrahim (38 y.o.     female)   Examination Date: 2023   :  1965 ;    Confirmed: Yes MRN: [id number]  CSN: [id number]   Insurance: Payor: [employer] / Plan: [employer] / Product Type: *No Product type* /   Insurance ID:  - (Worker's Comp)  PT Insurance Information: [employer] Secondary Insurance (if applicable):     Referring Physician: Adria Elizabeth MD       Visits to Date/Visits Approved:     No Show/Cancelled Appts: 0  0     Medical Diagnosis: Contusion of left elbow, initial encounter [S50.02XA]  Contusion of left knee, initial encounter [S80.02XA]  Sprain of unspecified site of left knee, initial encounter [S83. 92XA]  Sprain of unspecified site of right knee, initial encounter [S83.91XA]        Treatment Diagnosis: R knee pain, decreased R knee P/AROM, LE weakness, impaired gait and functional mobility     PERTINENT MEDICAL HISTORY   Patient Assessed for Rehabilitation Services: Yes       Medical History: Chart Reviewed: Yes   Past Medical History:   Diagnosis Date    Cervical spondylosis without myelopathy 3/5/2021     Surgical History:   Past Surgical History:   Procedure Laterality Date    CARPAL TUNNEL RELEASE      bilat    DILATION AND CURETTAGE OF UTERUS      TUBAL LIGATION         Medications:   Current Outpatient Medications:     cyclobenzaprine (FLEXERIL) 10 MG tablet, Take 10 mg by mouth 3 times daily as needed for Muscle spasms Took 20mg at midnight last night, Disp: , Rfl:     ibuprofen (ADVIL;MOTRIN) 600 MG tablet, Take 1 tablet by mouth 2 times daily as needed for Pain, Disp: 30 tablet, Rfl: 0    naproxen (NAPROSYN) 250 MG tablet, Take 1 tablet by mouth 2 times daily (with meals) (Patient not taking: Reported on 2023), Disp: 30 tablet, Rfl: 0  Allergies: Patient has no known allergies.       Imaging (if applicable): XR ELBOW LEFT (MIN 3 VIEWS)    Result Date: 2/12/2023  EXAMINATION: THREE XRAY VIEWS OF THE LEFT ELBOW 2/12/2023 4:57 pm COMPARISON: None. HISTORY: ORDERING SYSTEM PROVIDED HISTORY: pain TECHNOLOGIST PROVIDED HISTORY: Reason for exam:->pain What reading provider will be dictating this exam?->CRC FINDINGS: There is no elbow effusion. There is no acute fracture or dislocation. Alignment is normal.     No acute abnormality. XR KNEE LEFT (MIN 4 VIEWS)    Result Date: 2/12/2023  EXAMINATION: 2 XRAY VIEWS OF THE LEFT KNEE; 2 XRAY VIEWS OF THE RIGHT KNEE 2/12/2023 4:57 pm COMPARISON: Right knee series from January 8, 2023 HISTORY: ORDERING SYSTEM PROVIDED HISTORY: pain TECHNOLOGIST PROVIDED HISTORY: Reason for exam:->pain What reading provider will be dictating this exam?->CRC FINDINGS: LEFT KNEE: Radiographs of the left knee demonstrate no fractures with preserved alignment. Joint spaces appear normal. No significant degenerative changes. Osseous mineralization is normal.  There is no large joint effusion. RIGHT KNEE: Radiographs of the right knee demonstrate no fractures with preserved alignment. Joint spaces appear normal. No significant degenerative changes. Osseous mineralization is normal.  There is no large joint effusion. No osseous abnormality seen about either knee. Normal overall alignment. There are no joint effusions. RECOMMENDATION: In the setting of recent trauma, if there is persistent symptoms and physical exam warrants a repeat radiograph in 10-14 days could be considered as occult fractures may not be evident on initial imaging evaluation.      XR KNEE RIGHT (MIN 4 VIEWS)    Result Date: 2/12/2023  EXAMINATION: 2 XRAY VIEWS OF THE LEFT KNEE; 2 XRAY VIEWS OF THE RIGHT KNEE 2/12/2023 4:57 pm COMPARISON: Right knee series from January 8, 2023 HISTORY: ORDERING SYSTEM PROVIDED HISTORY: pain TECHNOLOGIST PROVIDED HISTORY: Reason for exam:->pain What reading provider will be dictating this exam?->CRC FINDINGS: LEFT KNEE: Radiographs of the left knee demonstrate no fractures with preserved alignment. Joint spaces appear normal. No significant degenerative changes. Osseous mineralization is normal.  There is no large joint effusion. RIGHT KNEE: Radiographs of the right knee demonstrate no fractures with preserved alignment. Joint spaces appear normal. No significant degenerative changes. Osseous mineralization is normal.  There is no large joint effusion. No osseous abnormality seen about either knee. Normal overall alignment. There are no joint effusions. RECOMMENDATION: In the setting of recent trauma, if there is persistent symptoms and physical exam warrants a repeat radiograph in 10-14 days could be considered as occult fractures may not be evident on initial imaging evaluation. SUBJECTIVE EXAMINATION     History obtained from[de-identified] Patient, Chart Review,      Family/Caregiver Present: No    Subjective History: Onset Date: 02/11/23  Subjective: Pt reports ongoing bilaterally knee pain since 2/11/23 after a fall at work, landing on both knees. Pt reports this is her second fall while at work, with the first occuring in January; pt reports first fall initially caused R knee pain only and second fall has led to loli knee pain. Pt states her L elbow was also injured but has improved. Pt reports walking is currently abnormally d/t knee pain. Pt states knee paih has been constant with temporary relief via self massage and heat. Pt has returned to work on light duty sitting for 8 hr shifts/5 days per week, but would goal to return back to normal work duties.   Additional Pertinent Hx (if applicable):     Prior diagnostic testing[de-identified] X-ray (R knee MRI tomorrow)      Learning/Language: Learning  Does the patient/guardian have any barriers to learning?: No barriers  Will there be a co-learner?: No  What is the preferred language of the patient/guardian?: English  Is an  required?: No  How does the patient/guardian prefer to learn new concepts?: Listening, Reading, Demonstration, Pictures/Videos     Pain Screening    Pain Screening  Patient Currently in Pain: Yes  Pain Assessment: 0-10  Pain Level: 5  Best Pain Level: 2  Worst Pain Level: 9  Pain Type: Acute pain  Pain Location: Knee  Pain Orientation: Right, Left (R more than L)  Pain Descriptors: Sharp, Burning    Functional Status    Social History:    Social History  Lives With: Alone  Type of Home: House  Home Layout: One level, Laundry in basement  Home Access: Stairs to enter with rails  Entrance Stairs - Rails: Both  Entrance Stairs - Number of Steps: 3  Bathroom Equipment: Shower chair  Home Equipment: Rollator    Occupation/Interests:   Occupation: Full time employment  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  Leisure & Hobbies: yoga    Prior Level of Function:   100% Independent        Current Level of Function:   25%      ADL Assistance: Needs assistance (laundry in basement; reports too painful to climb steps)  Ambulation Assistance: Independent  Transfer Assistance: Independent  Active : Yes         OBJECTIVE EXAMINATION     Restrictions:   Restrictions/Precautions: Fall Risk          Review of Systems:  Vision: Within Functional Limits  Hearing: Within functional limits  Follows Commands: Within Functional Limits    Observations:    No significant knee joint effusion   No visible bruising  Guarded behavior to P/AROM of R knee    Palpation:    Increased tenderness to R patellar tendon and distal quads    Mobility:   Bed mobility  Supine to Sit: Independent  Sit to Supine: Independent  Bed Mobility Comments: unable to extend R knee comfortably     Transfers  Sit to Stand: Modified independent  Stand to Sit: Modified independent  Ambulation  Surface: Carpet  Device: No Device  Assistance: Independent  Gait Deviations: Slow Mary, Decreased step length, Decreased step height  Distance: 50ft  Comments: pain verbalized  Stairs/Curb  Stairs?: No     Left AROM  Right AROM         AROM LLE (degrees)  LLE AROM : WNL  LLE General AROM: full A/PROM    AROM RLE (degrees)  RLE General AROM: very sensitive to both passive and active ROM; special testing unable to be performed d/t guarding  R Knee Flexion (0-145): 85 deg  R Knee Extension (0): lacking 10 deg ext        Left Strength  Right Strength       Grossly 3+/5 or better, LLE          Grossly 3- to 3/5, RLE    Strength limited by pain          Outcomes Score:  Exam: LEFS: 15/80    Treatment:    Exercises:   Exercises  Exercise 1: seated heel slides x 10 loli  Exercise 2: seated calf raises x 10  Exercise 3: seated gastroc stretch w/ strap: 30\" holds  Exercise 4: *quad sets  Exercise 5: *SLR  Exercise 6: *alter-G treadmill (walking, sink Ex)  Exercise 7: *S/L hip ABD     Modalities:  Modalities  Modalities:  (*CP, *Estim, *MHP, *ultrasound)        Manual:  Manual Therapy  Joint Mobilization: *patellar mobs  Soft Tissue Mobilizaton: *distal quads, gastrocs  Other: *KT taping, R knee     *Indicates exercise,modality, or manual techniques to be initiated when appropriate       ASSESSMENT     Impression: Assessment: Pt is a 64 yo female presenting with ongoing knee pain s/p a fall 2/11/23. Pt expresses most pain and functional limitation secondary to her R knee. Pt displays decreased R knee ROM, antalgic gait, and weakness throughout her RLE. Pt signficantly guarded throughout her R leg therefore special testing unable to be performed. Pt scheduled for MRI tomorrow. Pt can benefit from PT services to work on pain mgmt, ROM/strength progression, and HEP implementation to improve self mgmt of symptoms and return to PLOF.     Body Structures, Functions, Activity Limitations Requiring Skilled Therapeutic Intervention: Increased pain, Decreased ROM, Decreased strength, Decreased functional mobility , Decreased high-level IADLs, Decreased balance    Statement of Medical Necessity: Physical Therapy is both indicated and medically necessary as outlined in the POC to increase the likelihood of meeting the functionally related goals stated below. Patient's Activity Tolerance: Patient tolerated evaluation without incident, Patient limited by pain      Patient's rehabilitation potential/prognosis is considered to be: Good    Factors which may impact rehabilitation potential include: Pain tolerance/management, Chronicity of problem     Patient Education: PT Education: PT Role, Plan of Care, Goals, Evaluative findings, Insurance, Home Exercise Program, Anatomy of condition     GOALS   Patient Goal(s): Patient Goals : \"to walk normal\"    Short Term Goals Completed by 2-3 weeks Goal Status   STG 1Pt will report decreased R knee pain to < /= 5/10 at worst for improved functional tolerance New   STG 2 Pt will demo at least 0-110 deg of pain free R knee P/AROM. New     Long Term Goals Completed by 4-6 weeks Goal Status   LTG 1 Pt will be ambulatory community distances > 1000ft with normalized step length and resolved antalgia New   LTG 2 Pt will display 5/5 LE strength to reciprocally navigate steps to enter/exit basement for laundry completion. New   LTG 3 Pt will report decreased R knee pain to </= 2/10 at worst for improved functional tolerance New   LTG 4 Pt will be independent with HEP and recommended therapeutic pain mgmt techniques.  New        TREATMENT PLAN       Requires PT Follow-Up: Yes    Treatment may include any combination of the following: ROM, Strengthening, Balance training, Functional mobility training, Gait training, Stair training, Neuromuscular re-education, Manual, Pain management, Return to work related activity, Home exercise program, Safety education & training, Patient/Caregiver education & training, Equipment evaluation, education, & procurement, Modalities  Modalities: Heat/Cold, Ultrasound, E-stim - unattended     Frequency / Duration:  Patient to be seen 1-3 times per week for 4-6 weeks  Plan Comment: C-9 authorized for 12V until 4/28/23          Eval Complexity:   Decision Making: Medium Complexity  History: Personal Factors and/or Comorbidities Impacting POC: Medium  History: 2 falls this year  Examination of body system(s) including body structures and functions, activity limitations, and/or participation restrictions: Medium  Exam: LEFS: 15/80  Clinical Presentation: Medium  Clinical Presentation: evolving    POST-PAIN     Pain Rating (0-10 pain scale):  5 /10 (\"I feel like I just did a full workout\")  Location and pain description same as pre-treatment unless indicated. Action: [] NA  [] Call Physician  [x] Perform HEP  [x] Meds as prescribed    Evaluation and patient rights have been reviewed and patient agrees with plan of care. Yes  [x]  No  []   Explain:     Alon Fall Risk Assessment  Risk Factor Scale  Score   History of Falls [x] Yes  [] No 25  0 25   Secondary Diagnosis [x] Yes  (knee pain)  [] No 15  0 15   Ambulatory Aid [] Furniture  [] Crutches/cane/walker  [x] None/bedrest/wheelchair/nurse 30  15  0    IV/Heparin Lock [] Yes  [x] No 20  0    Gait/Transferring [] Impaired  [] Weak  [x] Normal/bedrest/immobile 20  10  0    Mental Status [] Forgets limitations  [x] Oriented to own ability 15  0       Total: 40     Based on the Assessment score: check the appropriate box. []  No intervention needed   Low =   Score of 0-24  [x]  Use standard prevention interventions Moderate =  Score of 24-44   [x] Discuss fall prevention strategies   [x] Indicate moderate falls risk on eval  []  Use high risk prevention interventions High = Score of 45 and higher   [] Discuss fall prevention strategies   [] Provide supervision during treatment time      Minutes:  PT Individual Minutes  Time In: 1505  Time Out: 1555  Minutes: 50  Timed Code Treatment Minutes: 10 Minutes  Procedure Minutes: 40 min eval (4875-1563 = 40 min);  1 Mod PT eval charge     Timed Activity Minutes Units   Ther Ex/HEP [facility]  9778-4557 = 10 minutes 1     Electronically signed by Dimitri Esteban PT on 3/13/23 at 5:29 PM EDT

## 2023-03-13 NOTE — PROGRESS NOTES
[address]  PHYSICAL THERAPY PLAN OF CARE   [address] [place], [address] [facility]         Ph: [phone]  Fax: [phone]    [] Certification  [] Recertification [x]  Plan of Care  [] Progress Note [] Discharge      Referring Provider: Aleah Corona MD     From:  Cleveland Mccormack, PT, DPT  Patient: Saira Moreno (42 y.o. female) : 1965 Date: 3/13/2023  Medical Diagnosis: Contusion of left elbow, initial encounter [S50.02XA]  Contusion of left knee, initial encounter [S80.02XA]  Sprain of unspecified site of left knee, initial encounter [S83. 92XA]  Sprain of unspecified site of right knee, initial encounter [S83.91XA]       Treatment Diagnosis: R knee pain, decreased R knee P/AROM, LE weakness, impaired gait and functional mobility    Plan of Care/Certification Expiration Date: 23   Progress Report Period from:  3/13/2023  to 3/13/2023    Visits to Date: 1 No Show: 0 Cancelled Appts: 0    OBJECTIVE:   Short Term Goals - Time Frame for Short Term Goals: 2-3 weeks    Goals Current/Discharge status  Status   Short Term Goal 1: Pt will report decreased R knee pain to < /= 5/10 at worst for improved functional tolerance   Pain Screening  Patient Currently in Pain: Yes  Pain Assessment: 0-10  Pain Level: 5  Best Pain Level: 2  Worst Pain Level: 9  Pain Type: Acute pain  Pain Location: Knee  Pain Orientation: Right, Left (R more than L)  Pain Descriptors: Sharp, Burning     New   Short Term Goal 2: Pt will demo at least 0-110 deg of pain free R knee P/AROM.       AROM LLE (degrees)  LLE AROM : WNL  LLE General AROM: full A/PROM     AROM RLE (degrees)  RLE General AROM: very sensitive to both passive and active ROM; special testing unable to be performed d/t guarding  R Knee Flexion (0-145): 85 deg  R Knee Extension (0): lacking 10 deg ext     New     Long Term Goals - Time Frame for Long Term Goals : 4-6 weeks  Goals Current/ Discharge status Status   Long Term Goal 1: Pt will be ambulatory community distances > 1000ft with normalized step length and resolved antalgia Ambulation  Surface: Carpet  Device: No Device  Assistance: Independent  Gait Deviations: Slow Mary, Decreased step length, Decreased step height  Distance: 50ft  Comments: pain verbalized    Self limits climbing stairs at this time d/t knee pain     New   Long Term Goal 2: Pt will display 5/5 LE strength to reciprocally navigate steps to enter/exit basement for laundry completion. Grossly 3+/5 or better, LLE    Grossly 3- to 3/5, RLE   Strength limited by pain   New   Long Term Goal 3: Pt will report decreased R knee pain to </= 2/10 at worst for improved functional tolerance  Pain Screening  Patient Currently in Pain: Yes  Pain Assessment: 0-10  Pain Level: 5  Best Pain Level: 2  Worst Pain Level: 9  Pain Type: Acute pain  Pain Location: Knee  Pain Orientation: Right, Left (R more than L)  Pain Descriptors: Sharp, Burning     New   Long Term Goal 4: Pt will be independent with HEP and recommended therapeutic pain mgmt techniques. HEP started today; progressions to be implemented as appropriate   [employer], Functions, Activity Limitations Requiring Skilled Therapeutic Intervention: Increased pain, Decreased ROM, Decreased strength, Decreased functional mobility , Decreased high-level IADLs, Decreased balance  Assessment: Pt is a 64 yo female presenting with ongoing knee pain s/p a fall 2/11/23. Pt expresses most pain and functional limitation secondary to her R knee. Pt displays decreased R knee ROM, antalgic gait, and weakness throughout her RLE. Pt signficantly guarded throughout her R leg therefore special testing unable to be performed. Pt scheduled for MRI tomorrow. Pt can benefit from PT services to work on pain mgmt, ROM/strength progression, and HEP implementation to improve self mgmt of symptoms and return to PLOF. Therapy Prognosis: Good      PT Education: PT Role;Plan of Care;Goals; Evaluative findings; Insurance;Home Exercise Program;Anatomy of condition    PLAN: [x] Evaluate and Treat  Frequency/Duration:  Plan Frequency: 1-3  Plan weeks: 4-6  Current Treatment Recommendations: ROM, Strengthening, Balance training, Functional mobility training, Gait training, Stair training, Neuromuscular re-education, Manual, Pain management, Return to work related activity, Home exercise program, Safety education & training, Patient/Caregiver education & training, Equipment evaluation, education, & procurement, Modalities  Modalities: Heat/Cold, Ultrasound, E-stim - unattended  Additional Comments: C-9 authorized for 12V until 4/28/23     Precautions:Restrictions/Precautions: Fall Risk                           Patient Status:[x] Continue/ Initiate plan of Care     [] Discharge PT. Recommend pt continue with HEP. [] Additional visits requested, Please re-certify for additional visits:     [] Hold        Signature: Electronically signed by Cleveland Mccormack PT on 3/13/23 at 5:38 PM EDT      If you have any questions or concerns, please don't hesitate to call. Thank you for your referral.    I have reviewed this plan of care and certify a need for medically necessary rehabilitation services.     Physician Signature:__________________________________________________________  Date:  Please sign and return

## 2023-03-14 ENCOUNTER — HOSPITAL ENCOUNTER (OUTPATIENT)
Dept: MRI IMAGING | Age: 58
Discharge: HOME | End: 2023-03-16
Payer: COMMERCIAL

## 2023-03-14 DIAGNOSIS — S80.01XA CONTUSION OF RIGHT KNEE, INITIAL ENCOUNTER: ICD-10-CM

## 2023-03-14 PROCEDURE — 73721 MRI JNT OF LWR EXTRE W/O DYE: CPT

## 2023-03-15 ENCOUNTER — HOSPITAL ENCOUNTER (OUTPATIENT)
Dept: PHYSICAL THERAPY | Age: 58
Setting detail: THERAPIES SERIES
Discharge: HOME | End: 2023-03-15
Payer: COMMERCIAL

## 2023-03-15 PROCEDURE — 97110 THERAPEUTIC EXERCISES: CPT

## 2023-03-15 ASSESSMENT — PAIN DESCRIPTION - LOCATION: LOCATION: KNEE

## 2023-03-15 ASSESSMENT — PAIN DESCRIPTION - ORIENTATION: ORIENTATION: RIGHT

## 2023-03-15 ASSESSMENT — PAIN SCALES - GENERAL: PAINLEVEL_OUTOF10: 5

## 2023-03-15 NOTE — PROGRESS NOTES
[address] [facility]  Outpatient Physical Therapy    Treatment Note        Date: 3/15/2023  Patient: Latisha Lund  : 1965   Confirmed: Yes  MRN: [id number]  Referring Provider: Janey Hubbard MD    Medical Diagnosis: Contusion of left elbow, initial encounter [S50.02XA]  Contusion of left knee, initial encounter [S80.02XA]  Sprain of unspecified site of left knee, initial encounter [S83. 92XA]  Sprain of unspecified site of right knee, initial encounter [S83.91XA]       Treatment Diagnosis: R knee pain, decreased R knee P/AROM, LE weakness, impaired gait and functional mobility    Visit Information:  Insurance: Payor: [employer] / Plan: [employer] / Product Type: *No Product type* /   PT Visit Information  Onset Date: 23  PT Insurance Information: [facility]  Total # of Visits Approved: 12  Total # of Visits to Date: 2  Plan of Care/Certification Expiration Date: 23  No Show: 0  Canceled Appointment: 0  Progress Note Counter:  by 23    Subjective Information:  Subjective: Pt reports her knee is feeling awful today d/t chilly temperature in her work office today. Pt reports she does heel slides at home in the shower.   HEP Compliance:  [x] Good [] Fair [] Poor [] Reports not doing due to:    Pain Screening  Patient Currently in Pain: Yes  Pain Assessment: 0-10  Pain Level: 5  Pain Location: Knee  Pain Orientation: Right    Treatment:  Exercises:  Exercises  Exercise 1: seated heel slides: x 3 min RLE, x 3 min LLE  Exercise 2: supine DKTC w/ legs on ball: x 3 min  Exercise 3: SAQ: x 20 alternating  Exercise 4: quad setting into towel roll: 3\" x 10-15 bilaterally  Exercise 5: SLR x 10 loli (increased difficulty on RLE)  Exercise 6: *alter-G treadmill (walking, sink Ex)  Exercise 7: *S/L hip ABD       Modalities:  *trial next visit  Electric stimulation, unattended (CPT 72628) /  (Medicare)  Patient Position: Seated  E-stim location: Right, Knee  E-stim type: Interferential (IFC)  E-stim via: 4 Electrode Pads  Limitations addressed: Pain modulation       *Indicates exercise, modality, or manual techniques to be initiated when appropriate    Objective Measures:   N/T    Assessment: Body Structures, Functions, Activity Limitations Requiring Skilled Therapeutic Intervention: Increased pain, Decreased ROM, Decreased strength, Decreased functional mobility , Decreased high-level IADLs, Decreased balance  Assessment: Instructed pt on light ROM and mat level strengthening exericses to further improve pain free ROM and joint stability. Pt still very guarded to placing R knee into terminal extension but displayed gradual improvement as session and exercises progressed. Pt unable to expose knees today d/t clothing limitations, therefore appropriate attire discussed to allow all treatment interventions. Plan to trial KT taping and E-stim next visit to further aide in pain mgmt. Pt instructed to keep all exercises within pain free intensity. Treatment Diagnosis: R knee pain, decreased R knee P/AROM, LE weakness, impaired gait and functional mobility  Therapy Prognosis: Good       Post-Pain Assessment:       Pain Rating (0-10 pain scale):  7 /10   Location and pain description same as pre-treatment unless indicated. Action: [] NA   [] Perform HEP  [x] Meds as prescribed  [] Modalities as prescribed   [] Call Physician     GOALS   Patient Goal(s): Patient Goals : \"to walk normal\"    Short Term Goals Completed by 2-3 weeks Goal Status   STG 1 Pt will report decreased R knee pain to < /= 5/10 at worst for improved functional tolerance In progress   STG 2 Pt will demo at least 0-110 deg of pain free R knee P/AROM.  In progress     Long Term Goals Completed by 4-6 weeks Goal Status   LTG 1 Pt will be ambulatory community distances > 1000ft with normalized step length and resolved antalgia In progress   LTG 2 Pt will display 5/5 LE strength to reciprocally navigate steps to enter/exit basement for laundry completion. In progress   LTG 3 Pt will report decreased R knee pain to </= 2/10 at worst for improved functional tolerance In progress   LTG 4 Pt will be independent with HEP and recommended therapeutic pain mgmt techniques. In progress          Plan:  Frequency/Duration:  Plan  Plan Frequency: 1-3  Plan weeks: 4-6  Current Treatment Recommendations: ROM, Strengthening, Balance training, Functional mobility training, Gait training, Stair training, Neuromuscular re-education, Manual, Pain management, Return to work related activity, Home exercise program, Safety education & training, Patient/Caregiver education & training, Equipment evaluation, education, & procurement, Modalities  Modalities: Heat/Cold, Ultrasound, E-stim - unattended  Additional Comments: C-9 authorized for 12V until 4/28/23  Pt to continue current HEP. See objective section for any therapeutic exercise changes, additions or modifications this date.     Therapy Time:      PT Individual Minutes  Time In: 9289  Time Out: 7264  Minutes: 31  Timed Code Treatment Minutes: 31 Minutes  Procedure Minutes: 0    Modality Time In Time Out Total Minutes Units    Ther ex ([id number]) [id number]     Electronically signed by Lisa Randolph, PT on 3/15/23 at 5:04 PM EDT

## 2023-03-20 ENCOUNTER — HOSPITAL ENCOUNTER (OUTPATIENT)
Dept: PHYSICAL THERAPY | Age: 58
Setting detail: THERAPIES SERIES
Discharge: HOME | End: 2023-03-20
Payer: COMMERCIAL

## 2023-03-20 PROCEDURE — 97110 THERAPEUTIC EXERCISES: CPT

## 2023-03-20 ASSESSMENT — PAIN DESCRIPTION - ORIENTATION: ORIENTATION: RIGHT

## 2023-03-20 ASSESSMENT — PAIN DESCRIPTION - DESCRIPTORS: DESCRIPTORS: ACHING;SHARP;DULL

## 2023-03-20 ASSESSMENT — PAIN DESCRIPTION - PAIN TYPE: TYPE: ACUTE PAIN

## 2023-03-20 ASSESSMENT — PAIN DESCRIPTION - LOCATION: LOCATION: KNEE

## 2023-03-20 ASSESSMENT — PAIN SCALES - GENERAL: PAINLEVEL_OUTOF10: 4

## 2023-03-20 NOTE — PROGRESS NOTES
[address] [facility]  Outpatient Physical Therapy    Treatment Note        Date: 3/20/2023  Patient: Oanh Santoyo  : 1965   Confirmed: Yes  MRN: [id number]  Referring Provider: Bambi Moscoso MD    Medical Diagnosis: Contusion of left elbow, initial encounter [S50.02XA]  Contusion of left knee, initial encounter [S80.02XA]  Sprain of unspecified site of left knee, initial encounter [S83. 92XA]  Sprain of unspecified site of right knee, initial encounter [S83.91XA]       Treatment Diagnosis: R knee pain, decreased R knee P/AROM, LE weakness, impaired gait and functional mobility    Visit Information:  Insurance: Payor: [employer] / Plan: [employer] / Product Type: *No Product type* /   PT Visit Information  Onset Date: 23  PT Insurance Information: [facility]  Total # of Visits Approved: 12  Total # of Visits to Date: 3  Plan of Care/Certification Expiration Date: 23  No Show: 0  Canceled Appointment: 0  Progress Note Counter: 3/12 by 23    Subjective Information:  Subjective: it hurts diagonally superior to my knee cap, friday I was walking to my car and theback of my knees were burning. you woke up at 2am and it was the worst pain I have had. I took a muscle relaxor fell asleep around 6:30am and then woke up at 8:30 and it wasn't so bad.   HEP Compliance:  [x] Good [] Fair [] Poor [] Reports not doing due to:    Pain Screening  Patient Currently in Pain: Yes  Pain Level: 4  Best Pain Level: 4  Worst Pain Level: 10  Pain Type: Acute pain  Pain Location: Knee  Pain Orientation: Right  Pain Descriptors: Aching, Sharp, Dull (dull ache surrounding the knee and sharp pain superior laterally isolated to small Tribal of 3\" in diameter)    Treatment:  Exercises:  Exercises  Exercise 1: seated heel slides: x 3 min RLE, x 3 min LLE  Exercise 2: supine DKTC w/ legs on ball: x 15-20\" x 5 due to increased reported pain with motion  Exercise 3: SAQ: x 20 alternating 8 on RLE due to

## 2023-03-22 ENCOUNTER — HOSPITAL ENCOUNTER (OUTPATIENT)
Dept: PHYSICAL THERAPY | Age: 58
Setting detail: THERAPIES SERIES
Discharge: HOME | End: 2023-03-22
Payer: COMMERCIAL

## 2023-03-22 PROCEDURE — 97110 THERAPEUTIC EXERCISES: CPT

## 2023-03-22 PROCEDURE — 97140 MANUAL THERAPY 1/> REGIONS: CPT

## 2023-03-22 ASSESSMENT — PAIN DESCRIPTION - ORIENTATION: ORIENTATION: RIGHT

## 2023-03-22 ASSESSMENT — PAIN DESCRIPTION - LOCATION: LOCATION: KNEE

## 2023-03-22 ASSESSMENT — PAIN SCALES - GENERAL: PAINLEVEL_OUTOF10: 5

## 2023-03-22 ASSESSMENT — PAIN DESCRIPTION - DESCRIPTORS: DESCRIPTORS: ACHING;SHARP;DULL

## 2023-03-22 ASSESSMENT — PAIN DESCRIPTION - PAIN TYPE: TYPE: ACUTE PAIN

## 2023-03-22 NOTE — PROGRESS NOTES
chondromalacia KT tape R knee at end of tx session following modalities; pt reports having KT tape before w/o adverse reaction. Consent signed and placed in paper chart; total manual 15 mins  Treatment Reasoning  Limitations addressed: Joint motion    Modalities:  Moist Heat (CPT 82226)  Patient Position: Supine hook-lying  Number Minutes Moist Heat: 10  Moist heat location: Right, Knee, Left  Post treatment skin assessment: Intact  Limitations addressed: Pain modulation, Tissue extensibility  Cryotherapy (CPT 66329)  Patient Position: Supine hook-lying  Number Minutes Cryotherapy: x10 minutes after heat  Cryotherapy location: Right, Knee  Limitations addressed: Pain modulation       *Indicates exercise, modality, or manual techniques to be initiated when appropriate    Objective Measures:      STG 2 Current Status[de-identified] 3/22/2023 seated R knee flex AROM 108    Assessment: Body Structures, Functions, Activity Limitations Requiring Skilled Therapeutic Intervention: Increased pain, Decreased ROM, Decreased strength, Decreased functional mobility , Decreased high-level IADLs, Decreased balance  Assessment: Pt initially tolerating tx well w/o significant increase in pain. Initiated patellar mobs w/ pt noting improved mobility after w/ increased R knee flex AROM. Then attempted sit to stand w/ emphasis on equal weight bearing w/ pt reporting severe increase in R knee pain where pt then became tearful. Pt wishing to trial MHP again to B knees, therefore did for 10 minutes. After pt reporting increased irritation therefore trialed cold pack to R knee w/ pt reporting some relief after, followed by KT tape application to R knee. Pt concluded tx w/ slightly elevated pt to 7-8/10 and stated that she was going to go back to work post-tx session. Encouraged pt to ice R knee again tonight.   Treatment Diagnosis: R knee pain, decreased R knee P/AROM, LE weakness, impaired gait and functional mobility  Therapy Prognosis: Good

## 2023-03-27 ENCOUNTER — HOSPITAL ENCOUNTER (OUTPATIENT)
Dept: PHYSICAL THERAPY | Age: 58
Setting detail: THERAPIES SERIES
Discharge: HOME | End: 2023-03-27
Payer: COMMERCIAL

## 2023-03-27 PROCEDURE — 97110 THERAPEUTIC EXERCISES: CPT

## 2023-03-27 ASSESSMENT — PAIN DESCRIPTION - PAIN TYPE: TYPE: ACUTE PAIN

## 2023-03-27 ASSESSMENT — PAIN SCALES - GENERAL: PAINLEVEL_OUTOF10: 2

## 2023-03-27 ASSESSMENT — PAIN DESCRIPTION - ORIENTATION: ORIENTATION: RIGHT

## 2023-03-27 ASSESSMENT — PAIN DESCRIPTION - LOCATION: LOCATION: KNEE

## 2023-03-27 ASSESSMENT — PAIN DESCRIPTION - DESCRIPTORS: DESCRIPTORS: ACHING;SHARP;DULL

## 2023-03-27 NOTE — PROGRESS NOTES
at worst for improved functional tolerance In progress   STG 2 Pt will demo at least 0-110 deg of pain free R knee P/AROM. In progress        Long Term Goals Completed by 4-6 weeks Goal Status   LTG 1 Pt will be ambulatory community distances > 1000ft with normalized step length and resolved antalgia In progress   LTG 2 Pt will display 5/5 LE strength to reciprocally navigate steps to enter/exit basement for laundry completion. In progress   LTG 3 Pt will report decreased R knee pain to </= 2/10 at worst for improved functional tolerance In progress   LTG 4 Pt will be independent with HEP and recommended therapeutic pain mgmt techniques. In progress               Plan:  Frequency/Duration:  Plan  Plan Frequency: 1-3  Plan weeks: 4-6  Current Treatment Recommendations: ROM, Strengthening, Balance training, Functional mobility training, Gait training, Stair training, Neuromuscular re-education, Manual, Pain management, Return to work related activity, Home exercise program, Safety education & training, Patient/Caregiver education & training, Equipment evaluation, education, & procurement, Modalities  Modalities: Heat/Cold, Ultrasound, E-stim - unattended  Additional Comments: C-9 authorized for 12V until 4/28/23  Pt to continue current HEP. See objective section for any therapeutic exercise changes, additions or modifications this date.     Therapy Time:      PT Individual Minutes  Time In: 3727  Time Out: 9277  Minutes: 54  Timed Code Treatment Minutes: 44 Minutes  Procedure Minutes: 10 CP  Modality Time In Time Out Total Minutes Units    Ther ex ([id number]) [id number] [phone]   Moist Heat/Cold Pack ([id number]) [id number]         Electronically signed by Gretel Zarate PTA on 3/27/23 at 5:59 PM EDT

## 2023-03-29 ENCOUNTER — HOSPITAL ENCOUNTER (OUTPATIENT)
Dept: PHYSICAL THERAPY | Age: 58
Setting detail: THERAPIES SERIES
Discharge: HOME | End: 2023-03-29
Payer: COMMERCIAL

## 2023-03-29 PROCEDURE — 97110 THERAPEUTIC EXERCISES: CPT

## 2023-03-29 PROCEDURE — 97140 MANUAL THERAPY 1/> REGIONS: CPT

## 2023-03-29 ASSESSMENT — PAIN SCALES - GENERAL: PAINLEVEL_OUTOF10: 3

## 2023-03-29 ASSESSMENT — PAIN DESCRIPTION - DESCRIPTORS: DESCRIPTORS: ACHING

## 2023-03-29 ASSESSMENT — PAIN DESCRIPTION - PAIN TYPE: TYPE: ACUTE PAIN

## 2023-03-29 ASSESSMENT — PAIN DESCRIPTION - LOCATION: LOCATION: KNEE

## 2023-03-29 ASSESSMENT — PAIN DESCRIPTION - ORIENTATION: ORIENTATION: RIGHT

## 2023-03-29 NOTE — PROGRESS NOTES
stretch 20''x3 B  Exercise 20: HEP: hip abd, hip add, bridge, HS stretch  Treatment Reasoning  Limitations addressed: Mobility    Manual:   Manual Therapy  Soft Tissue Mobilizaton: R distal quad and ITB  Other: chondromalacia KT tape B knee at beginning of treatment total manual 16 mins  Treatment Reasoning  Limitations addressed: Joint motion    Modalities:  Cryotherapy (CPT 44927)  Patient Position: Seated  Number Minutes Cryotherapy: x10 minutes  Cryotherapy location: Right, Knee  Limitations addressed: Pain modulation       *Indicates exercise, modality, or manual techniques to be initiated when appropriate    Objective Measures:     Assessment: Body Structures, Functions, Activity Limitations Requiring Skilled Therapeutic Intervention: Increased pain, Decreased ROM, Decreased strength, Decreased functional mobility , Decreased high-level IADLs, Decreased balance  Assessment: Initiated nustep however pt only able to tolerate 3.75 mins before increased pain and requesting to stop. Initiated HS stretch w/ emphasis on stretching within a tolerable range. Progressed HEP w/ pt verbalizing understanding. Also initiated STM to mid-distal quad and ITB d/t pt reporting increased pain in this region throughout session w/ palpable nodules felt. Treatment Diagnosis: R knee pain, decreased R knee P/AROM, LE weakness, impaired gait and functional mobility  Therapy Prognosis: Good       Patient Education: [] NA   HEP    Post-Pain Assessment:       Pain Rating (0-10 pain scale):   4/10   Location and pain description same as pre-treatment unless indicated.    Action: [] NA   [x] Perform HEP  [] Meds as prescribed  [] Modalities as prescribed   [] Call Physician     GOALS   Patient Goal(s): Patient Goals : \"to walk normal\"    Short Term Goals Completed by 2-3 weeks Goal Status   STG 1 Pt will report decreased R knee pain to < /= 5/10 at worst for improved functional tolerance In progress   STG 2 Pt will demo at least 0-110 deg

## 2023-04-03 ENCOUNTER — HOSPITAL ENCOUNTER (OUTPATIENT)
Dept: PHYSICAL THERAPY | Age: 58
Setting detail: THERAPIES SERIES
Discharge: HOME | End: 2023-04-03
Payer: COMMERCIAL

## 2023-04-03 PROCEDURE — 97110 THERAPEUTIC EXERCISES: CPT

## 2023-04-03 ASSESSMENT — PAIN DESCRIPTION - LOCATION: LOCATION: KNEE

## 2023-04-03 ASSESSMENT — PAIN DESCRIPTION - ORIENTATION: ORIENTATION: RIGHT

## 2023-04-03 ASSESSMENT — PAIN SCALES - GENERAL: PAINLEVEL_OUTOF10: 6

## 2023-04-03 ASSESSMENT — PAIN DESCRIPTION - DESCRIPTORS: DESCRIPTORS: ACHING

## 2023-04-03 ASSESSMENT — PAIN DESCRIPTION - PAIN TYPE: TYPE: ACUTE PAIN

## 2023-04-03 NOTE — PROGRESS NOTES
abd x 10, HL hip add x 15 x 5\" ball  Exercise 10: Bridges x 10 x 5\"  Exercise 11: seated HS stretch 20''x3 B  Exercise 12: Leg press #10  x 15  Exercise 20: HEP: hip abd, hip add, bridge, HS stretch TKE  Treatment Reasoning  Limitations addressed: Mobility    Manual:   Manual Therapy  Other: chondromalacia KT tape B knee at beginning of treatment total manual 10 mins  Treatment Reasoning  Limitations addressed: Joint motion       *Indicates exercise, modality, or manual techniques to be initiated when appropriate    Objective Measures:         STG 2 Current Status[de-identified] 4/3/23 1°-101° Knee ext/flex        Assessment: Body Structures, Functions, Activity Limitations Requiring Skilled Therapeutic Intervention: Increased pain, Decreased ROM, Decreased strength, Decreased functional mobility , Decreased high-level IADLs, Decreased balance  Assessment: Pt presented with increased pain this date due to work changes which impacted Pt tolerance to certain activities this date. Modified intensity and duration of some exercises to improve tolerance with good results. Introduced new exercises this date to improve strength and stability  this date. pt tolerated changes well. Pt declined modalities this date due to using heat before coming in and having issues with cold at work. Progress as able. Treatment Diagnosis: R knee pain, decreased R knee P/AROM, LE weakness, impaired gait and functional mobility  Therapy Prognosis: Good       Patient Education: [x] NA       Post-Pain Assessment:       Pain Rating (0-10 pain scale):  4 /10   Location and pain description same as pre-treatment unless indicated.    Action: [] NA   [x] Perform HEP  [] Meds as prescribed  [] Modalities as prescribed   [] Call Physician     GOALS   Patient Goal(s): Patient Goals : \"to walk normal\"    Short Term Goals Completed by 2-3 weeks Goal Status   STG 1 Pt will report decreased R knee pain to < /= 5/10 at worst for improved functional tolerance In

## 2023-04-05 ENCOUNTER — HOSPITAL ENCOUNTER (OUTPATIENT)
Dept: PHYSICAL THERAPY | Age: 58
Setting detail: THERAPIES SERIES
Discharge: HOME | End: 2023-04-05
Payer: COMMERCIAL

## 2023-04-05 PROCEDURE — 97110 THERAPEUTIC EXERCISES: CPT

## 2023-04-05 ASSESSMENT — PAIN DESCRIPTION - ORIENTATION: ORIENTATION: RIGHT

## 2023-04-05 ASSESSMENT — PAIN DESCRIPTION - LOCATION: LOCATION: KNEE

## 2023-04-05 ASSESSMENT — PAIN SCALES - GENERAL: PAINLEVEL_OUTOF10: 2

## 2023-04-05 NOTE — PROGRESS NOTES
Structures, Functions, Activity Limitations Requiring Skilled Therapeutic Intervention: Increased pain, Decreased ROM, Decreased strength, Decreased functional mobility , Decreased high-level IADLs, Decreased balance  Assessment: Pt with improved pain levels verbalized after 3 days of rest. Knee flexion ROM improvement to 124 deg. Terminal extension still painful with pt lacking 5 deg. Attempted various exercises and passive motion to increase to full knee extension, although repeated motions of knee flexion/ext still causes increased pain. Reviewed tolerable exercises with pt at this time to be focused on for HEP. Plan to decreased tx frequency to 1x/wk as pt responds favorably to increased rest intervals and current exercise progressions are limited by pain. Will continue to assess progress towards goals weekly. Treatment Diagnosis: R knee pain, decreased R knee P/AROM, LE weakness, impaired gait and functional mobility  Therapy Prognosis: Good       Post-Pain Assessment:       Pain Rating (0-10 pain scale):  2-3 /10   Location and pain description same as pre-treatment unless indicated. Action: [] NA   [] Perform HEP  [] Meds as prescribed  [x] Modalities as prescribed   [] Call Physician     GOALS   Patient Goal(s): Patient Goals : \"to walk normal\"    Short Term Goals Completed by 2-3 weeks Goal Status   STG 1 Pt will report decreased R knee pain to < /= 5/10 at worst for improved functional tolerance In progress   STG 2 Pt will demo at least 0-110 deg of pain free R knee P/AROM. In progress     Long Term Goals Completed by 4-6 weeks Goal Status   LTG 1 Pt will be ambulatory community distances > 1000ft with normalized step length and resolved antalgia In progress   LTG 2 Pt will display 5/5 LE strength to reciprocally navigate steps to enter/exit basement for laundry completion.  In progress   LTG 3 Pt will report decreased R knee pain to </= 2/10 at worst for improved functional tolerance In progress   LTG 4

## 2023-04-17 ENCOUNTER — HOSPITAL ENCOUNTER (OUTPATIENT)
Dept: PHYSICAL THERAPY | Age: 58
Setting detail: THERAPIES SERIES
Discharge: HOME | End: 2023-04-17
Payer: COMMERCIAL

## 2023-04-17 PROCEDURE — 97110 THERAPEUTIC EXERCISES: CPT

## 2023-04-17 NOTE — PROGRESS NOTES
[address] [facility]  Outpatient Physical Therapy    Treatment Note        Date: 2023  Patient: Padilla Stein  : 1965   Confirmed: Yes  MRN: [id number]  Referring Provider: Maru Morales MD    Medical Diagnosis: Contusion of left elbow, initial encounter [S50.02XA]  Contusion of left knee, initial encounter [S80.02XA]  Sprain of unspecified site of left knee, initial encounter [S83. 92XA]  Sprain of unspecified site of right knee, initial encounter [S83.91XA]       Treatment Diagnosis: R knee pain, decreased R knee P/AROM, LE weakness, impaired gait and functional mobility    Visit Information:  Insurance: Payor: [employer] / Plan: [employer] / Product Type: *No Product type* /   PT Visit Information  Onset Date: 23  PT Insurance Information: [facility]  Total # of Visits Approved: 12  Total # of Visits to Date: 10  Plan of Care/Certification Expiration Date: 23  No Show: 0  Canceled Appointment: 1  Progress Note Counter: 10/12 by 23    Subjective Information:  Subjective: Pt reports she can now fully extend her R knee (demo'ing improved pain free ROM). Pt requests to trial leg strengthening machines today.   HEP Compliance:  [] Good [] Fair [] Poor [] Reports not doing due to:    Pain Screening  Patient Currently in Pain: No    Treatment:  Exercises:  Exercises  Exercise 1: elliptical: L1 x 5 min  Exercise 2: quad set SLR x 25 loli  Exercise 3: s/l hip abduction: x 25 loli  Exercise 4: body weight squat to low table: x 25  Exercise 5: step-up march: 6\" x 25 loli  Exercise 6: seated HS curl: GTB, 2x10 loli  Exercise 7: supine hip ext bridge with legs on ball: x 25       *Indicates exercise, modality, or manual techniques to be initiated when appropriate    Objective Measures:       STG 1 Current Status[de-identified] 23: max pain 1/10 over last week  STG 2 Current Status[de-identified] 23: 0-132 deg, pain free (R knee)           LTG 1 Current Status[de-identified] 23: normalized gait upon

## 2023-04-24 ENCOUNTER — HOSPITAL ENCOUNTER (OUTPATIENT)
Dept: PHYSICAL THERAPY | Age: 58
Setting detail: THERAPIES SERIES
Discharge: HOME | End: 2023-04-24
Payer: COMMERCIAL

## 2023-04-24 PROCEDURE — 97110 THERAPEUTIC EXERCISES: CPT

## 2023-04-24 ASSESSMENT — PAIN DESCRIPTION - ORIENTATION: ORIENTATION: RIGHT

## 2023-04-24 ASSESSMENT — PAIN SCALES - GENERAL: PAINLEVEL_OUTOF10: 2

## 2023-04-24 ASSESSMENT — PAIN DESCRIPTION - DESCRIPTORS: DESCRIPTORS: SORE

## 2023-04-24 ASSESSMENT — PAIN DESCRIPTION - LOCATION: LOCATION: KNEE

## 2023-04-24 ASSESSMENT — PAIN DESCRIPTION - PAIN TYPE: TYPE: ACUTE PAIN

## 2023-04-24 NOTE — PROGRESS NOTES
[address] [facility]  Outpatient Physical Therapy    Treatment Note        Date: 2023  Patient: Asaf Helm  : 1965   Confirmed: Yes  MRN: [id number]  Referring Provider: Linda Snider MD    Medical Diagnosis: Contusion of left elbow, initial encounter [S50.02XA]  Contusion of left knee, initial encounter [S80.02XA]  Sprain of unspecified site of left knee, initial encounter [S83. 92XA]  Sprain of unspecified site of right knee, initial encounter [S83.91XA]       Treatment Diagnosis: R knee pain, decreased R knee P/AROM, LE weakness, impaired gait and functional mobility    Visit Information:  Insurance: Payor: [employer] / Plan: [employer] / Product Type: *No Product type* /   PT Visit Information  Onset Date: 23  PT Insurance Information: [facility]  Total # of Visits Approved: 12  Total # of Visits to Date: 11  Plan of Care/Certification Expiration Date: 23  No Show: 0  Canceled Appointment: 1  Progress Note Counter:  by 23    Subjective Information:  Subjective: Pt reports she tried riding her tractor on Thursday and when she got off pt states \"I could hardly walk. \" Pt reports increased pain since, though gradually subsiding reporting 1/10 pain at rest and 2/10 pain while driving.   HEP Compliance:  [] Good [x] Fair [] Poor [] Reports not doing due to:    Pain Screening  Patient Currently in Pain: Yes  Pain Assessment: 0-10  Pain Level: 2 (1-2)  Pain Type: Acute pain  Pain Location: Knee  Pain Orientation: Right  Pain Descriptors: Sore    Treatment:  Exercises:  Exercises  Exercise 1: Sports art bike L1 x5 mins  Exercise 5: step-up march: 6\" F/L x10 RLE  Exercise 6: seated HS curl: GTB, 2x10 loli  Exercise 8: apollo leg press 10# x15; HR 10# x15  Exercise 9: LAQ 5''x10 R 2#       *Indicates exercise, modality, or manual techniques to be initiated when appropriate    Objective Measures:   STG 1 Current Status[de-identified] 23 max pain 8/10 when getting off

## 2023-04-26 ENCOUNTER — HOSPITAL ENCOUNTER (OUTPATIENT)
Dept: PHYSICAL THERAPY | Age: 58
Setting detail: THERAPIES SERIES
Discharge: HOME | End: 2023-04-26
Payer: COMMERCIAL

## 2023-04-26 PROCEDURE — 97110 THERAPEUTIC EXERCISES: CPT

## 2023-04-26 PROCEDURE — 97140 MANUAL THERAPY 1/> REGIONS: CPT

## 2023-04-26 ASSESSMENT — PAIN DESCRIPTION - LOCATION: LOCATION: KNEE

## 2023-04-26 ASSESSMENT — PAIN DESCRIPTION - ORIENTATION: ORIENTATION: RIGHT

## 2023-04-26 ASSESSMENT — PAIN DESCRIPTION - DESCRIPTORS: DESCRIPTORS: SORE

## 2023-04-26 ASSESSMENT — PAIN SCALES - GENERAL: PAINLEVEL_OUTOF10: 2

## 2023-04-26 ASSESSMENT — PAIN DESCRIPTION - PAIN TYPE: TYPE: ACUTE PAIN

## 2023-04-26 NOTE — PROGRESS NOTES
[address]  PHYSICAL THERAPY PLAN OF CARE   [address] [place], [address] [facility]         Phone: [phone]  Fax: [phone]    [] Certification  [] Recertification []  Plan of Care  [x] Progress Note [] Discharge      Referring Provider: Kris Walters MD     From:  Robson Cabrera, PT, DPT  Patient: Giovanna Saini (89 y.o. female) : 1965 Date: 2023  Medical Diagnosis: Contusion of left elbow, initial encounter [S50.02XA]  Contusion of left knee, initial encounter [S80.02XA]  Sprain of unspecified site of left knee, initial encounter [S83. 92XA]  Sprain of unspecified site of right knee, initial encounter [S83.91XA]       Treatment Diagnosis: R knee pain, decreased R knee P/AROM, LE weakness, impaired gait and functional mobility    Progress Report Period from:  3/13/2023  to 2023    Visits to Date: 12 No Show: 0 Cancelled Appts: 1    OBJECTIVE:   Short Term Goals - Time Frame for Short Term Goals: 2-3 weeks    Goals Current/Discharge status  Status   Short Term Goal 1: Pt will report decreased R knee pain to < /= 5/10 at worst for improved functional tolerance  STG 1 Current Status[de-identified] 23 pain ranges 1-8/10   In progress   Short Term Goal 2: Pt will demo at least 0-110 deg of pain free R knee P/AROM. STG 2 Current Status[de-identified] 23 R knee supine 0-134 w/ pain in each direction   Partially met, In progress     Long Term Goals - Time Frame for Long Term Goals : 4-6 weeks  Goals Current/ Discharge status Status   Long Term Goal 1: Pt will be ambulatory community distances > 1000ft with normalized step length and resolved antalgia LTG 1 Current Status[de-identified] 23 mild antalgia R>L side d/t pain ambulating > 1000 ft   Partially met   Long Term Goal 2: Pt will display 5/5 LE strength to reciprocally navigate steps to enter/exit basement for laundry completion.  LTG 2 Current Status[de-identified] 23 R ankle 5/5, R knee flex 4-/5, knee ext 4/5, SLR 4/5, hip abd /5, ext /5, hip
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
## 2024-07-08 ENCOUNTER — HOSPITAL ENCOUNTER (EMERGENCY)
Age: 59
Discharge: HOME | End: 2024-07-08
Payer: COMMERCIAL

## 2024-07-08 ENCOUNTER — APPOINTMENT (OUTPATIENT)
Dept: CT IMAGING | Age: 59
End: 2024-07-08
Payer: COMMERCIAL

## 2024-07-08 VITALS
HEART RATE: 79 BPM | HEIGHT: 63 IN | BODY MASS INDEX: 21.97 KG/M2 | DIASTOLIC BLOOD PRESSURE: 72 MMHG | RESPIRATION RATE: 16 BRPM | SYSTOLIC BLOOD PRESSURE: 130 MMHG | OXYGEN SATURATION: 98 % | TEMPERATURE: 98.4 F | WEIGHT: 124 LBS
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
## 2024-07-08 LAB — POC CREATININE WHOLE BLOOD: 0.8

## 2024-07-08 PROCEDURE — 6370000000 HC RX 637 (ALT 250 FOR IP): Performed by: NURSE PRACTITIONER

## 2024-07-08 PROCEDURE — 71260 CT THORAX DX C+: CPT

## 2024-07-08 PROCEDURE — 74177 CT ABD & PELVIS W/CONTRAST: CPT

## 2024-07-08 PROCEDURE — 6360000004 HC RX CONTRAST MEDICATION: Performed by: NURSE PRACTITIONER

## 2024-07-08 PROCEDURE — 99285 EMERGENCY DEPT VISIT HI MDM: CPT

## 2024-07-08 RX ORDER — ONDANSETRON 4 MG/1
4 TABLET, ORALLY DISINTEGRATING ORAL ONCE
Status: COMPLETED | OUTPATIENT
Start: 2024-07-08 | End: 2024-07-08

## 2024-07-08 RX ADMIN — ONDANSETRON 4 MG: 4 TABLET, ORALLY DISINTEGRATING ORAL at 13:16

## 2024-07-08 RX ADMIN — IOPAMIDOL 75 ML: 755 INJECTION, SOLUTION INTRAVENOUS at 13:51

## 2024-07-08 ASSESSMENT — LIFESTYLE VARIABLES
HOW OFTEN DO YOU HAVE A DRINK CONTAINING ALCOHOL: NEVER
HOW MANY STANDARD DRINKS CONTAINING ALCOHOL DO YOU HAVE ON A TYPICAL DAY: PATIENT DOES NOT DRINK

## 2024-07-08 ASSESSMENT — PAIN SCALES - GENERAL: PAINLEVEL_OUTOF10: 8

## 2024-07-08 ASSESSMENT — ENCOUNTER SYMPTOMS
TROUBLE SWALLOWING: 0
SHORTNESS OF BREATH: 0
SORE THROAT: 0
VOMITING: 0
COUGH: 0
BACK PAIN: 0
ABDOMINAL PAIN: 1
NAUSEA: 0

## 2024-07-08 ASSESSMENT — PAIN DESCRIPTION - PAIN TYPE: TYPE: ACUTE PAIN

## 2024-07-08 ASSESSMENT — PAIN - FUNCTIONAL ASSESSMENT: PAIN_FUNCTIONAL_ASSESSMENT: 0-10

## 2024-07-08 ASSESSMENT — PAIN DESCRIPTION - FREQUENCY: FREQUENCY: CONTINUOUS
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
## 2024-07-10 LAB
PERFORMED ON: NORMAL
POC CREATININE: 0.8 MG/DL (ref 0.6–1.2)
POC SAMPLE TYPE: NORMAL

## 2024-11-13 ENCOUNTER — APPOINTMENT (OUTPATIENT)
Dept: RADIOLOGY | Facility: HOSPITAL | Age: 59
End: 2024-11-13
Payer: COMMERCIAL

## 2024-11-13 ENCOUNTER — HOSPITAL ENCOUNTER (EMERGENCY)
Facility: HOSPITAL | Age: 59
Discharge: HOME | End: 2024-11-13
Payer: COMMERCIAL

## 2024-11-13 VITALS
WEIGHT: 120 LBS | BODY MASS INDEX: 22.08 KG/M2 | HEIGHT: 62 IN | HEART RATE: 68 BPM | DIASTOLIC BLOOD PRESSURE: 71 MMHG | RESPIRATION RATE: 18 BRPM | TEMPERATURE: 98.1 F | OXYGEN SATURATION: 98 % | SYSTOLIC BLOOD PRESSURE: 127 MMHG

## 2024-11-13 DIAGNOSIS — S56.912A ELBOW STRAIN, LEFT, INITIAL ENCOUNTER: Primary | ICD-10-CM

## 2024-11-13 PROCEDURE — 2500000004 HC RX 250 GENERAL PHARMACY W/ HCPCS (ALT 636 FOR OP/ED): Performed by: PHYSICIAN ASSISTANT

## 2024-11-13 PROCEDURE — 2500000005 HC RX 250 GENERAL PHARMACY W/O HCPCS: Performed by: PHYSICIAN ASSISTANT

## 2024-11-13 PROCEDURE — 99283 EMERGENCY DEPT VISIT LOW MDM: CPT | Performed by: PHYSICIAN ASSISTANT

## 2024-11-13 PROCEDURE — 73080 X-RAY EXAM OF ELBOW: CPT | Mod: LT

## 2024-11-13 PROCEDURE — 73080 X-RAY EXAM OF ELBOW: CPT | Mod: LEFT SIDE | Performed by: STUDENT IN AN ORGANIZED HEALTH CARE EDUCATION/TRAINING PROGRAM

## 2024-11-13 PROCEDURE — 2500000001 HC RX 250 WO HCPCS SELF ADMINISTERED DRUGS (ALT 637 FOR MEDICARE OP): Performed by: PHYSICIAN ASSISTANT

## 2024-11-13 RX ORDER — OXYCODONE HYDROCHLORIDE 5 MG/1
5 TABLET ORAL ONCE
Status: COMPLETED | OUTPATIENT
Start: 2024-11-13 | End: 2024-11-13

## 2024-11-13 RX ORDER — LIDOCAINE 50 MG/G
1 PATCH TOPICAL DAILY
Qty: 30 PATCH | Refills: 0 | Status: SHIPPED | OUTPATIENT
Start: 2024-11-13

## 2024-11-13 RX ORDER — LIDOCAINE 560 MG/1
1 PATCH PERCUTANEOUS; TOPICAL; TRANSDERMAL ONCE
Status: DISCONTINUED | OUTPATIENT
Start: 2024-11-13 | End: 2024-11-14 | Stop reason: HOSPADM

## 2024-11-13 RX ORDER — NAPROXEN 500 MG/1
500 TABLET ORAL
Qty: 30 TABLET | Refills: 0 | Status: SHIPPED | OUTPATIENT
Start: 2024-11-13 | End: 2024-11-28

## 2024-11-13 RX ORDER — ACETAMINOPHEN 325 MG/1
650 TABLET ORAL ONCE
Status: COMPLETED | OUTPATIENT
Start: 2024-11-13 | End: 2024-11-13

## 2024-11-13 ASSESSMENT — PAIN DESCRIPTION - PAIN TYPE: TYPE: ACUTE PAIN

## 2024-11-13 ASSESSMENT — COLUMBIA-SUICIDE SEVERITY RATING SCALE - C-SSRS
2. HAVE YOU ACTUALLY HAD ANY THOUGHTS OF KILLING YOURSELF?: NO
1. IN THE PAST MONTH, HAVE YOU WISHED YOU WERE DEAD OR WISHED YOU COULD GO TO SLEEP AND NOT WAKE UP?: NO
6. HAVE YOU EVER DONE ANYTHING, STARTED TO DO ANYTHING, OR PREPARED TO DO ANYTHING TO END YOUR LIFE?: NO

## 2024-11-13 ASSESSMENT — PAIN DESCRIPTION - LOCATION: LOCATION: ELBOW

## 2024-11-13 ASSESSMENT — PAIN SCALES - GENERAL
PAINLEVEL_OUTOF10: 5 - MODERATE PAIN
PAINLEVEL_OUTOF10: 10 - WORST POSSIBLE PAIN

## 2024-11-13 ASSESSMENT — PAIN DESCRIPTION - ORIENTATION: ORIENTATION: LEFT

## 2024-11-13 ASSESSMENT — PAIN DESCRIPTION - DESCRIPTORS: DESCRIPTORS: SHOOTING

## 2024-11-13 ASSESSMENT — PAIN DESCRIPTION - PROGRESSION: CLINICAL_PROGRESSION: NOT CHANGED

## 2024-11-13 ASSESSMENT — PAIN - FUNCTIONAL ASSESSMENT: PAIN_FUNCTIONAL_ASSESSMENT: 0-10

## 2024-11-13 NOTE — Clinical Note
Akosua Gore was seen and treated in our emergency department on 11/13/2024.  She may return to work on 11/15/2024.       If you have any questions or concerns, please don't hesitate to call.      Cedric Capps PA-C

## 2024-11-14 ENCOUNTER — OFFICE VISIT (OUTPATIENT)
Dept: ORTHOPEDIC SURGERY | Facility: CLINIC | Age: 59
End: 2024-11-14
Payer: COMMERCIAL

## 2024-11-14 DIAGNOSIS — G56.22 ULNAR NEURITIS, LEFT: ICD-10-CM

## 2024-11-14 DIAGNOSIS — S56.912A FOREARM STRAIN, LEFT, INITIAL ENCOUNTER: ICD-10-CM

## 2024-11-14 DIAGNOSIS — S56.912A ELBOW STRAIN, LEFT, INITIAL ENCOUNTER: ICD-10-CM

## 2024-11-14 DIAGNOSIS — M77.12 LATERAL EPICONDYLITIS OF LEFT ELBOW: ICD-10-CM

## 2024-11-14 PROCEDURE — 99214 OFFICE O/P EST MOD 30 MIN: CPT | Performed by: FAMILY MEDICINE

## 2024-11-14 RX ORDER — NAPROXEN 500 MG/1
500 TABLET ORAL
Qty: 28 TABLET | Refills: 0 | Status: SHIPPED | OUTPATIENT
Start: 2024-11-14 | End: 2024-11-28

## 2024-11-14 RX ORDER — METHYLPREDNISOLONE 4 MG/1
TABLET ORAL
Qty: 1 EACH | Refills: 0 | Status: SHIPPED | OUTPATIENT
Start: 2024-11-14

## 2024-11-14 NOTE — ED PROVIDER NOTES
HPI   Chief Complaint   Patient presents with   • Elbow Pain     Lifting metal brackets from tall table       The patient is a pleasant 59-year-old female who presents to the emergency room with a chief complaint of acute pain to her left elbow that occurred while she was lifting some heavy metal brackets from a fall.  Patient reports that she grabbed a bracket that when she went to lifted up she felt the sharp stabbing pain in her left elbow.  The pain is constant and worse with movement of her elbow as well as palpation of her lateral aspect of the elbow.  She complains of some tingling to the tips of her fingers which is intermittent.  She denies any swelling to the arm no history of DVT or PE.  Patient has not taken any medications for the pain prior to arrival.  This is a work-related injury.  She denies any neck pain, back pain, chest pain, shortness of breath.  She rates her pain a 9 out of a 10.  Is described as sharp and stabbing.      History provided by:  Patient and medical records          Patient History   History reviewed. No pertinent past medical history.  History reviewed. No pertinent surgical history.  No family history on file.  Social History     Tobacco Use   • Smoking status: Every Day     Current packs/day: 0.50     Types: Cigarettes   • Smokeless tobacco: Never   Substance Use Topics   • Alcohol use: Never   • Drug use: Never       Physical Exam   ED Triage Vitals [11/13/24 1950]   Temperature Heart Rate Respirations BP   36.7 °C (98.1 °F) 68 18 127/71      Pulse Ox Temp Source Heart Rate Source Patient Position   98 % Temporal Monitor Sitting      BP Location FiO2 (%)     Right arm --       Physical Exam  Vitals and nursing note reviewed.   Constitutional:       General: She is awake. She is not in acute distress.     Appearance: Normal appearance. She is well-developed, well-groomed and normal weight. She is not ill-appearing, toxic-appearing or diaphoretic.      Comments: Well-developed  59-year-old  female who is awake alert and oriented x 3, mild discomfort no acute distress.  Blood pressure is 127/71, heart rate 68, respirations 18, pulse ox is 98% on room air temperature 36.7 °C     HENT:      Head: Normocephalic and atraumatic.   Cardiovascular:      Rate and Rhythm: Normal rate and regular rhythm.      Pulses: Normal pulses.      Heart sounds: Normal heart sounds.   Musculoskeletal:         General: Tenderness and signs of injury present. No swelling.      Right shoulder: Normal.      Left shoulder: Normal.      Right upper arm: Normal.      Left upper arm: Normal.      Right elbow: Normal.      Left elbow: Decreased range of motion. Tenderness present in lateral epicondyle.      Right forearm: Normal.      Left forearm: Normal.      Right wrist: Normal.      Left wrist: Normal.      Right hand: Normal.      Left hand: Normal.        Arms:       Cervical back: Normal range of motion and neck supple. No tenderness.      Comments: Tenderness to palpation over the lateral epicondyle and Radial head.  Limited flexion extension supination pronation due to discomfort.  Intact distal pulses, no erythema, bruising or swelling to the elbow.   Skin:     General: Skin is warm and dry.      Capillary Refill: Capillary refill takes less than 2 seconds.      Findings: No bruising or erythema.   Neurological:      General: No focal deficit present.      Mental Status: She is alert and oriented to person, place, and time. Mental status is at baseline.      Sensory: No sensory deficit.   Psychiatric:         Mood and Affect: Mood normal.         Behavior: Behavior normal. Behavior is cooperative.         Thought Content: Thought content normal.         Judgment: Judgment normal.           ED Course & MDM   Diagnoses as of 11/13/24 0670   Elbow strain, left, initial encounter                 No data recorded     Miguel Angel Coma Scale Score: 15 (11/13/24 1950 : Janna Mendez RN)                            Medical Decision Making  Temperature 36.7 °C, heart rate 68, respirations 18, blood pressure was 127/71, pulse ox is 98% on room air  I have ordered x-rays of the left elbow, the patient had a lidocaine patch applied to the left elbow, she was given Decadron 10 mg p.o., acetaminophen 650 mg p.o., 1 oxycodone 5 mg p.o.  Based on the patient's description of her injury as well as physical exam I am suspicious for acute lateral epicondylitis/elbow strain.  X-ray of the left elbow shows no acute fracture or dislocation no significant joint effusion, no specific radiographic features to suggest lateral epicondylitis although this finding can be occult on radiographs.  I discussed results of workup with the patient.  He was discharged home with prescription for naproxen, lidocaine patches and [facility].  Initial impression is acute left elbow strain.  She was given a work note she was advised to return with any concerns or worsening of symptoms.  All questions answered prior to discharge        Procedure  Procedures     Cedric Capps PA-C  11/13/24 2385

## 2024-11-14 NOTE — PROGRESS NOTES
Acute Injury New Patient Visit    CC:   Chief Complaint   Patient presents with    Left Elbow - Pain     [facility]       HPI: Akosua is a 59 y.o.female who presents today with new complaints of [facility] injury, acute lateral sided elbow pain and numbness and tingling.  Patient presents here today with a work-related acute pain and discomfort to the elbow.  She points to the lateral epicondyle and the soft tissues medially as area of pain and discomfort she states her elbow got locked on her.  She does not feel like she is able to fully move it all the way straight.  She has no trouble bending it.  She has some numbness and tingling to the fourth and fifth digits.  She denies any known history of prior injury or trauma to the left elbow in the past.        Review of Systems   GENERAL: Negative for malaise, significant weight loss, fever  MUSCULOSKELETAL: See HPI  NEURO: positive  for numbness / tingling     Past Medical History  History reviewed. No pertinent past medical history.    Medication review  Medication Documentation Review Audit       Reviewed by Cole C Budinsky, MD (Physician) on 24 at 1326      Medication Order Taking? Sig Documenting Provider Last Dose Status   acetaminophen (Tylenol) tablet 650 mg [id number]   Cedric Capps PA-C   24   dexAMETHasone (Decadron) tablet 10 mg [id number]   Cedric Capps PA-C   24   lidocaine (Lidoderm) 5 % patch [id number]  Place 1 patch over 12 hours on the skin once daily. Remove & discard patch within 12 hours or as directed by MD. Cedric Capps PA-C  Active      Discontinued 24 0119   methylPREDNISolone (Medrol Dospak) 4 mg tablets [id number]  Follow schedule on package instructions Cole C Budinsky, MD  Active   naproxen (Naprosyn) 500 mg tablet [id number]  Take 1 tablet (500 mg) by mouth 2 times daily (morning and late afternoon) for 15 days. Cedric Capps PA-C  Active   naproxen (Naprosyn) 500 mg tablet [id number]   Take 1 tablet (500 mg) by mouth 2 times daily (morning and late afternoon) for 14 days. Cole C Budinsky, MD  Active   oxyCODONE (Roxicodone) immediate release tablet 5 mg [id number]   Cedric Capps PA-C   24                    Allergies  No Known Allergies    Social History  Social History     Socioeconomic History    Marital status: Single     Spouse name: Not on file    Number of children: Not on file    Years of education: Not on file    Highest education level: Not on file   Occupational History    Not on file   Tobacco Use    Smoking status: Every Day     Current packs/day: 0.50     Types: Cigarettes    Smokeless tobacco: Never   Substance and Sexual Activity    Alcohol use: Never    Drug use: Never    Sexual activity: Not on file   Other Topics Concern    Not on file   Social History Narrative    Not on file     Social Drivers of Health     Financial Resource Strain: Not on file   Food Insecurity: Not on file   Transportation Needs: Not on file   Physical Activity: Not on file   Stress: Not on file   Social Connections: Not on file   Intimate Partner Violence: Not on file   Housing Stability: Not on file       Surgical History  History reviewed. No pertinent surgical history.    Physical Exam:  GENERAL:  Patient is awake, alert, and oriented to person place and time.  Patient appears well nourished and well kept.  Affect Calm, Not Acutely Distressed.  HEENT:  Normocephalic, Atraumatic, EOMI  CARDIOVASCULAR:  Hemodynamically stable.  RESPIRATORY:  Normal respirations with unlabored breathing.  NEURO:  Gross sensation intact to the upper extremities bilaterally.Extremity: Left elbow exam demonstrates diffuse soft tissue swelling and tenderness palpation over the lateral epicondyle and proximal common extensor tendons.  The medial epicondyle has no tenderness to palpation flexor tendon bundle has minimal discomfort she has a positive Tinel sign at the ulnar/cubital tunnel.  Forearm  compartments are tender to palpation but soft.  Distal pulses and sensation are intact she has a limited ability to grasp and  she has limited 4-5 wrist extension strength wrist flexion is otherwise normal.  She lacks terminal extension by about 5 degrees flexion is otherwise normal.  No pain over the radial head.  No pain at the olecranon.  5 out of 5 biceps and tricep strength with no obvious palpable defect or deformity      Diagnostics: Previous imaging reviewed no presence for fracture or dislocation        Procedure: None  Procedures    Assessment:   Problem List Items Addressed This Visit    None  Visit Diagnoses       Elbow strain, left, initial encounter        Relevant Medications    methylPREDNISolone (Medrol Dospak) 4 mg tablets    naproxen (Naprosyn) 500 mg tablet    Other Relevant Orders    Tennis Elbow Strap    Sling    MR elbow left wo IV contrast    Lateral epicondylitis of left elbow        Relevant Medications    methylPREDNISolone (Medrol Dospak) 4 mg tablets    naproxen (Naprosyn) 500 mg tablet    Other Relevant Orders    Tennis Elbow Strap    Sling    MR elbow left wo IV contrast    Ulnar neuritis, left        Relevant Medications    methylPREDNISolone (Medrol Dospak) 4 mg tablets    naproxen (Naprosyn) 500 mg tablet    Other Relevant Orders    Tennis Elbow Strap    Sling    MR elbow left wo IV contrast    Forearm strain, left, initial encounter                 Plan: At this time we will offer the patient a comfortable supportive sling to offload weightbearing as able.  Will offer her an oral steroid anti-inflammatory and will also discussed a tennis elbow strap for compression and support.  Will obtain an MRI of the left elbow to further evaluate for the lateral epicondylitis and ulnar neuritis.  Would also like to make sure that there is no loose body or internal derangement due to the acuity of the immediate snap that she felt and pain now with mechanical symptoms.  She will be given a  light duty work note no use of left upper extremity x 4 weeks.  Will see her back in 3 weeks for repeat evaluation hopefully be able to review the results of the MRI.  Additionally recommend that she utilize Tylenol icing and topical muscle rubs and creams for pain control as well.  For now we will hold off on C9 prior authorization for injection or therapies as we will  await the results of the MRI first  Orders Placed This Encounter    Tennis Elbow Strap    Sling    MR elbow left wo IV contrast    methylPREDNISolone (Medrol Dospak) 4 mg tablets    naproxen (Naprosyn) 500 mg tablet      At the conclusion of the visit there were no further questions by the patient/family regarding their plan of care.  Patient was instructed to call or return with any issues, questions, or concerns regarding their injury and/or treatment plan described above.     11/14/24 at 5:52 PM - Cole C Budinsky, MD    Office: [phone]    This note was prepared using voice recognition software.  The details of this note are correct and have been reviewed, and corrected to the best of my ability.  Some grammatical errors may persist related to the Dragon software.

## 2024-11-14 NOTE — LETTER
November 14, 2024     Patient: Akosua Gore   YOB: 1965   Date of Visit: 11/14/2024       To Whom It May Concern:    It is my medical opinion that Akosua Gore may return to light duty immediately with the following restrictions: No use of the Lt upper extremity .    If you have any questions or concerns, please don't hesitate to call.         Sincerely,        Cole C Budinsky, MD

## 2024-11-15 DIAGNOSIS — G56.22 ULNAR NEURITIS, LEFT: ICD-10-CM

## 2024-11-15 DIAGNOSIS — M77.12 LATERAL EPICONDYLITIS OF LEFT ELBOW: ICD-10-CM

## 2024-11-15 RX ORDER — NAPROXEN 500 MG/1
500 TABLET ORAL 2 TIMES DAILY
Qty: 28 TABLET | Refills: 0 | Status: SHIPPED | OUTPATIENT
Start: 2024-11-15 | End: 2024-11-29

## 2024-11-15 RX ORDER — METHYLPREDNISOLONE 4 MG/1
TABLET ORAL
Qty: 21 TABLET | Refills: 0 | Status: SHIPPED | OUTPATIENT
Start: 2024-11-15

## 2024-11-29 ENCOUNTER — HOSPITAL ENCOUNTER (OUTPATIENT)
Dept: RADIOLOGY | Facility: CLINIC | Age: 59
Discharge: HOME | End: 2024-11-29
Payer: COMMERCIAL

## 2024-11-29 DIAGNOSIS — S56.912A ELBOW STRAIN, LEFT, INITIAL ENCOUNTER: ICD-10-CM

## 2024-11-29 DIAGNOSIS — M77.12 LATERAL EPICONDYLITIS OF LEFT ELBOW: ICD-10-CM

## 2024-11-29 DIAGNOSIS — G56.22 ULNAR NEURITIS, LEFT: ICD-10-CM

## 2024-11-29 PROCEDURE — 73221 MRI JOINT UPR EXTREM W/O DYE: CPT | Mod: LT

## 2024-12-11 ENCOUNTER — OFFICE VISIT (OUTPATIENT)
Dept: ORTHOPEDIC SURGERY | Facility: CLINIC | Age: 59
End: 2024-12-11
Payer: COMMERCIAL

## 2024-12-11 DIAGNOSIS — M77.12 LATERAL EPICONDYLITIS OF LEFT ELBOW: ICD-10-CM

## 2024-12-11 PROCEDURE — 99213 OFFICE O/P EST LOW 20 MIN: CPT | Performed by: FAMILY MEDICINE

## 2024-12-11 RX ORDER — NAPROXEN 500 MG/1
500 TABLET ORAL 2 TIMES DAILY
Qty: 60 TABLET | Refills: 0 | Status: SHIPPED | OUTPATIENT
Start: 2024-12-11 | End: 2025-01-10

## 2024-12-11 NOTE — LETTER
December 11, 2024     Patient: Akosua Gore   YOB: 1965   Date of Visit: 12/11/2024       To Whom It May Concern:    It is my medical opinion that Akosua Gore  is able to work with the following restrictions:  No lifting more than 15 lbs on the left.  No pushing or pulling activity.  She is able to work with the brace or sling .  These restrictions are in effect through 01-13-25.    If you have any questions or concerns, please don't hesitate to call.         Sincerely,        Cole C Budinsky, MD    CC: No Recipients

## 2024-12-11 NOTE — PROGRESS NOTES
Established Patient Follow-Up Visit    CC:   Chief Complaint   Patient presents with    Left Elbow - Pain     C       HPI:  Akosua is a 59 y.o. female returns here today for follow-up visit regarding: Follow-up left elbow pain [facility] injury.  She is here today to review the results of the MRI.  She states significant pain and discomfort points to the lateral side of the elbow as the area most affected still has some numbness and tingling to the fourth and fifth digits.  She states she is not currently working as they did not have any accommodation for light duty.  She is agreeable to PRP injection but does not want to do a steroid shot.  She has had multiple steroid shots in the past none of which have ever provided her any significant relief.          REVIEW OF SYSTEMS:  GENERAL: Negative for malaise, significant weight loss, fever  MUSCULOSKELETAL: See HPI  NEURO: Positive for numbness / tingling       PHYSICAL EXAM:  -Neuro: Gross sensation intact to the upper extremities bilaterally.  -Extremity: Left elbow demonstrates significant tenderness palpation over the lateral epicondyle there is moderately less discomfort and swelling to the medial epicondyle there is no olecranon pain forearm compartments are soft and compressible she has 4 out of 5 strength with significant pain reproduced with resisted wrist extension wrist flexion is also reproducing pain with normal strength.  Pronation and supination are limited with pain at the lateral epicondyle.  Distal pulses and sensation are intact.  Positive Tinel sign at the cubital tunnel.    IMAGING: MRI results reviewed with patient as below  MR elbow left wo IV contrast  Narrative: Interpreted By:  Vj Avila,   STUDY:  MR ELBOW LEFT WO IV CONTRAST; ;  11/29/2024 1:34 pm      INDICATION:  Signs/Symptoms:pain. Lifting injury with sudden onset pain and  popping posteriorly. Elbow pain in knocking with numbness and  tingling.      COMPARISON:  Plain film  examination of 11/13/2024      ACCESSION NUMBER(S):  [id number]      ORDERING CLINICIAN:  COLE BUDINSKY      TECHNIQUE:  Multiplanar and multisequential MR images of the left elbow are  performed. There is mild motion degradation on T2 weighted sequences.      FINDINGS:  There is a small amount of joint fluid present. There is no sizable  effusion.      There is no sign of acute osseous fracture, bone marrow edema, or  osseous mass. There is no osteochondral lesion of the capitellum.  There is a tiny focus of subarticular degenerative signal in the  medial aspect of the radial head.      There is minimal intermediate signal at the biceps tendon insertion.  There is no tendon discontinuity or retraction. There is no  surrounding fluid. The brachialis tendon insertion is intact. The  triceps tendon insertion is intact. There is intermediate to  increased intrasubstance signal at the common extensor tendon origin  with some increased signal towards the lateral surface. The  appearance is compatible with tendinopathy and interstitial tearing.  There is no full-thickness tendon tear or retraction. There is some  intermediate intrasubstance signal in the radial collateral ligament  though no full-thickness tear. The common flexor tendon origin and  ulnar collateral ligament are intact.      There is no sign of intramuscular mass or fluid collection. There is  no deep fascial fluid. The subcutaneous fat is unremarkable.      There is no mass or fluid collection in the cubital tunnel.      Impression: Tendinosis and interstitial tearing at the common extensor tendon  origin. Correlate with clinical findings of lateral epicondylitis.      Minimal insertional biceps tendinopathy. There is no tendon tear.      No acute osseous abnormality.              MACRO:  None      Signed by: Vj Avila 11/29/2024 2:13 PM  Dictation workstation:   [id number]      PROCEDURE: None  Procedures     ASSESSMENT:   Follow-up visit  for:  Problem List Items Addressed This Visit    None  Visit Diagnoses       Lateral epicondylitis of left elbow        Relevant Orders    Referral to Physical Therapy    Tennis Elbow Strap    Wrist splint             PLAN: At this time we discussed with the patient the ability to submit a C9 prior authorization for physical therapy as well as PRP injection for her left lateral epicondyle tendinosis.  She should continue to ice we will refill an anti-inflammatory naproxen for.  She can also use topical muscle rubs and creams as well.  She can utilize a trauma splint which will be provided here today in the lieu of the sling.  Recommended that she start to come out of the sling especially can as we now know that there is no significant intra-articular pathology or fracture.  For now we will hold off on deferring nerve conduction EMG study for the potential ulnar neuritis as she still does have some symptoms.  We also discussed the ability to have the patient obtain a tennis elbow strap as well which she can utilize or alternate with her trauma splint.  Orders Placed This Encounter    Tennis Elbow Strap    Wrist splint    Referral to Physical Therapy           At the conclusion of the visit there were no further questions by the patient/family regarding their plan of care.  Patient was instructed to call or return with any issues, questions, or concerns regarding their injury and/or treatment plan described above.     12/11/24 at 2:44 PM - Cole C Budinsky, MD    Office: [phone]    This note was prepared using voice recognition software.  The details of this note are correct and have been reviewed, and corrected to the best of my ability.  Some grammatical errors may persist related to the Dragon software.

## 2024-12-11 NOTE — LETTER
December 11, 2024     Patient: Akosua Gore   YOB: 1965   Date of Visit: 12/11/2024       To Whom It May Concern:    It is my medical opinion that Akosua Gore  is to continue her current work restriction of no use of the left arm.  This restriction is in effect through 01-13-25 .    If you have any questions or concerns, please don't hesitate to call.         Sincerely,        Cole C Budinsky, MD    CC: No Recipients

## 2024-12-11 NOTE — LETTER
December 11, 2024     Patient: Akosua Gore   YOB: 1965   Date of Visit: 12/11/2024         To Whom It May Concern:    It is my medical opinion that Akosua Gore is able to work with the following restrictions:  No lifting more than 5 lbs on the left.  No pushing or pulling activity.  She is able to work with the brace or sling.  These restrictions are in effect through 01-13-25.    If you have any questions or concerns, please don't hesitate to call.         Sincerely,        Cole C Budinsky, MD    CC: No Recipients

## 2025-01-13 ENCOUNTER — APPOINTMENT (OUTPATIENT)
Dept: ORTHOPEDIC SURGERY | Facility: CLINIC | Age: 60
End: 2025-01-13
Payer: COMMERCIAL

## 2025-01-24 ENCOUNTER — APPOINTMENT (OUTPATIENT)
Dept: ORTHOPEDIC SURGERY | Facility: CLINIC | Age: 60
End: 2025-01-24
Payer: COMMERCIAL

## 2025-01-24 DIAGNOSIS — S56.912A ELBOW STRAIN, LEFT, INITIAL ENCOUNTER: ICD-10-CM

## 2025-01-24 DIAGNOSIS — M77.12 LATERAL EPICONDYLITIS OF LEFT ELBOW: Primary | ICD-10-CM

## 2025-01-24 PROCEDURE — 99213 OFFICE O/P EST LOW 20 MIN: CPT | Performed by: FAMILY MEDICINE

## 2025-01-24 NOTE — PROGRESS NOTES
Established Patient Follow-Up Visit    CC:   Chief Complaint   Patient presents with    Left Elbow - Pain, Worker's Compensation     Lateral epicondylitis       HPI:  Akosua is a 59 y.o. female returns here today for follow-up visit regarding: Persistent pain discomfort to the left elbow.  BWC injury.  Current claim is in a pending status and appeal.  She states they are not even allowing her to  medications.  She is done some over-the-counter lidocaine patches icing and continues to use her sling.  She has significant discomfort at rest and with activity.  She has been compliant with no use of left upper extremity.          REVIEW OF SYSTEMS:  GENERAL: Negative for malaise, significant weight loss, fever  MUSCULOSKELETAL: See HPI  NEURO: Negative for numbness / tingling       PHYSICAL EXAM:  -Neuro: Gross sensation intact to the upper extremities bilaterally.  -Extremity: Left elbow demonstrates significant soft tissue tenderness to palpation over the lateral epicondyle and the posterior lateral aspect of the soft tissues at the elbow joint.  There is no olecranon pain no medial epicondyle pain forearm compartments soft compressible but painful she has limited wrist extension strength and range of motion due to pain there is normal biceps and triceps strength today.   is decreased secondary to pain.    IMAGING: Previous MRI reviewed, no new images today  MR elbow left wo IV contrast  Narrative: Interpreted By:  Vj Avila,   STUDY:  MR ELBOW LEFT WO IV CONTRAST; ;  11/29/2024 1:34 pm      INDICATION:  Signs/Symptoms:pain. Lifting injury with sudden onset pain and  popping posteriorly. Elbow pain in knocking with numbness and  tingling.      COMPARISON:  Plain film examination of 11/13/2024      ACCESSION NUMBER(S):  [id number]      ORDERING CLINICIAN:  COLE BUDINSKY      TECHNIQUE:  Multiplanar and multisequential MR images of the left elbow are  performed. There is mild motion degradation  on T2 weighted sequences.      FINDINGS:  There is a small amount of joint fluid present. There is no sizable  effusion.      There is no sign of acute osseous fracture, bone marrow edema, or  osseous mass. There is no osteochondral lesion of the capitellum.  There is a tiny focus of subarticular degenerative signal in the  medial aspect of the radial head.      There is minimal intermediate signal at the biceps tendon insertion.  There is no tendon discontinuity or retraction. There is no  surrounding fluid. The brachialis tendon insertion is intact. The  triceps tendon insertion is intact. There is intermediate to  increased intrasubstance signal at the common extensor tendon origin  with some increased signal towards the lateral surface. The  appearance is compatible with tendinopathy and interstitial tearing.  There is no full-thickness tendon tear or retraction. There is some  intermediate intrasubstance signal in the radial collateral ligament  though no full-thickness tear. The common flexor tendon origin and  ulnar collateral ligament are intact.      There is no sign of intramuscular mass or fluid collection. There is  no deep fascial fluid. The subcutaneous fat is unremarkable.      There is no mass or fluid collection in the cubital tunnel.      Impression: Tendinosis and interstitial tearing at the common extensor tendon  origin. Correlate with clinical findings of lateral epicondylitis.      Minimal insertional biceps tendinopathy. There is no tendon tear.      No acute osseous abnormality.              MACRO:  None      Signed by: Vj Avila 11/29/2024 2:13 PM  Dictation workstation:   [id number]      PROCEDURE: None  Procedures     ASSESSMENT:   Follow-up visit for:  Problem List Items Addressed This Visit    None  Visit Diagnoses       Lateral epicondylitis of left elbow    -  Primary    Elbow strain, left, initial encounter                 PLAN: At this time we discussed with the patient  that we can continue with the sling for rest and immobilization as needed.  She understands that she needs to continue to come out of this and work on gentle range of motion and strength Carbery.  Recommendations for icing and ice massage as well as topical Voltaren gel which she can  over-the-counter as well as Tylenol and anti-inflammatories as needed.  Additionally she is utilizing lidocaine patches which do provide some level of relief.  Hopefully will be able to get a determination in the next 6 weeks and whether or not working on proceed forward with therapy and PRP injection.  For now have her continue with home exercises and stretching.  Over-the-counter anti-inflammatories and topicals as above.  No orders of the defined types were placed in this encounter.          At the conclusion of the visit there were no further questions by the patient/family regarding their plan of care.  Patient was instructed to call or return with any issues, questions, or concerns regarding their injury and/or treatment plan described above.     01/24/25 at 9:09 AM - Cole C Budinsky, MD    Office: [phone]    This note was prepared using voice recognition software.  The details of this note are correct and have been reviewed, and corrected to the best of my ability.  Some grammatical errors may persist related to the Dragon software.

## 2025-01-24 NOTE — LETTER
January 24, 2025     Patient: Akosua Gore   YOB: 1965   Date of Visit: 1/24/2025       To Whom It May Concern:    It is my medical opinion that Akosua Gore may return to light duty immediately with the following restrictions: No use of the left upper extremity for the next 6 weeks .    If you have any questions or concerns, please don't hesitate to call.         Sincerely,        Cole C Budinsky, MD

## 2025-03-11 ENCOUNTER — OFFICE VISIT (OUTPATIENT)
Dept: ORTHOPEDIC SURGERY | Facility: CLINIC | Age: 60
End: 2025-03-11
Payer: COMMERCIAL

## 2025-03-11 DIAGNOSIS — S56.912A ELBOW STRAIN, LEFT, INITIAL ENCOUNTER: Primary | ICD-10-CM

## 2025-03-11 DIAGNOSIS — M77.12 LATERAL EPICONDYLITIS OF LEFT ELBOW: ICD-10-CM

## 2025-03-11 PROCEDURE — 99213 OFFICE O/P EST LOW 20 MIN: CPT | Performed by: FAMILY MEDICINE

## 2025-03-11 NOTE — PROGRESS NOTES
Established Patient Follow-Up Visit    CC:   Chief Complaint   Patient presents with    Left Elbow - Follow-up, Pain     [facility]---Lateral epicondylitis of left elbow    -  Primary     Elbow strain, left, initial encounter           HPI:  Akosua is a 60 y.o. female returns here today for follow-up visit regarding: Follow-up left elbow pain lateral epicondylitis.  Patient states she is no worse no better still having a fair amount of discomfort she is compliant with the brace.  She had a recent hearing she states that she had initially 1 good case for her Worker's Comp. claim and had tentatively been given allowance for  injections and physical therapy as well as medication.  She is however under the advisement of her team to await the full duration of time that her employer has to appeal.  Reportedly the appeal process must be met by March 18.  She is currently at home as they are not allowing for light duty work.          REVIEW OF SYSTEMS:  GENERAL: Negative for malaise, significant weight loss, fever  MUSCULOSKELETAL: See HPI  NEURO: Negative for numbness / tingling       PHYSICAL EXAM:  -Neuro: Gross sensation intact to the upper extremities bilaterally.  -Extremity: Left elbow demonstrates moderate tenderness to palpation over the lateral epicondyle.  There is no medial epicondyle pain she has the ability to fully straighten her elbow but does have discomfort and stiffness of the biceps.  The elbow itself is nontender at the olecranon.  There is no triceps pain.  Forearm compartments otherwise soft and compressible  strength is intact.  Still with symptomatic pronation and supination.    IMAGING: Following doing her motherfucker  MR elbow left wo IV contrast  Narrative: Interpreted By:  Vj Avila,   STUDY:  MR ELBOW LEFT WO IV CONTRAST; ;  11/29/2024 1:34 pm      INDICATION:  Signs/Symptoms:pain. Lifting injury with sudden onset pain and  popping posteriorly. Elbow pain in knocking with numbness  and  tingling.      COMPARISON:  Plain film examination of 11/13/2024      ACCESSION NUMBER(S):  [id number]      ORDERING CLINICIAN:  COLE BUDINSKY      TECHNIQUE:  Multiplanar and multisequential MR images of the left elbow are  performed. There is mild motion degradation on T2 weighted sequences.      FINDINGS:  There is a small amount of joint fluid present. There is no sizable  effusion.      There is no sign of acute osseous fracture, bone marrow edema, or  osseous mass. There is no osteochondral lesion of the capitellum.  There is a tiny focus of subarticular degenerative signal in the  medial aspect of the radial head.      There is minimal intermediate signal at the biceps tendon insertion.  There is no tendon discontinuity or retraction. There is no  surrounding fluid. The brachialis tendon insertion is intact. The  triceps tendon insertion is intact. There is intermediate to  increased intrasubstance signal at the common extensor tendon origin  with some increased signal towards the lateral surface. The  appearance is compatible with tendinopathy and interstitial tearing.  There is no full-thickness tendon tear or retraction. There is some  intermediate intrasubstance signal in the radial collateral ligament  though no full-thickness tear. The common flexor tendon origin and  ulnar collateral ligament are intact.      There is no sign of intramuscular mass or fluid collection. There is  no deep fascial fluid. The subcutaneous fat is unremarkable.      There is no mass or fluid collection in the cubital tunnel.      Impression: Tendinosis and interstitial tearing at the common extensor tendon  origin. Correlate with clinical findings of lateral epicondylitis.      Minimal insertional biceps tendinopathy. There is no tendon tear.      No acute osseous abnormality.              MACRO:  None      Signed by: Vj Avila 11/29/2024 2:13 PM  Dictation workstation:   [id number]      PROCEDURE:  Procedures     ASSESSMENT:   Follow-up visit for:  Problem List Items Addressed This Visit    None  Visit Diagnoses       Elbow strain, left, initial encounter    -  Primary    Lateral epicondylitis of left elbow                 PLAN: At this time we will hold off on injection or therapy.  We will plan to see her back in 6 weeks for repeat evaluation we will continue with the same light duty work restrictions for now.  Hopefully be able to provide her the injection and medications.  For now she can continue with over-the-counter meds and icing with her trauma splint.  No need for x-rays at follow-up.  No orders of the defined types were placed in this encounter.          At the conclusion of the visit there were no further questions by the patient/family regarding their plan of care.  Patient was instructed to call or return with any issues, questions, or concerns regarding their injury and/or treatment plan described above.     03/11/25 at 6:07 PM - Cole C Budinsky, MD    Office: [phone]    This note was prepared using voice recognition software.  The details of this note are correct and have been reviewed, and corrected to the best of my ability.  Some grammatical errors may persist related to the Dragon software.

## 2025-03-11 NOTE — LETTER
March 11, 2025     Patient: Akosua Gore   YOB: 1965   Date of Visit: 3/11/2025       To Whom It May Concern:    It is my medical opinion that Akosua Gore may return to light duty immediately with the following restrictions: No use of the left upper extremity for the next 6 weeks .     If you have any questions or concerns, please don't hesitate to call.         Sincerely,         Cole C Budinsky, MD    CC: No Recipients

## 2025-04-22 ENCOUNTER — TELEPHONE (OUTPATIENT)
Dept: ORTHOPEDIC SURGERY | Facility: CLINIC | Age: 60
End: 2025-04-22

## 2025-04-22 ENCOUNTER — OFFICE VISIT (OUTPATIENT)
Dept: ORTHOPEDIC SURGERY | Facility: CLINIC | Age: 60
End: 2025-04-22
Payer: COMMERCIAL

## 2025-04-22 DIAGNOSIS — S56.912A FOREARM STRAIN, LEFT, INITIAL ENCOUNTER: ICD-10-CM

## 2025-04-22 DIAGNOSIS — M77.12 LATERAL EPICONDYLITIS OF LEFT ELBOW: Primary | ICD-10-CM

## 2025-04-22 PROCEDURE — 99213 OFFICE O/P EST LOW 20 MIN: CPT | Performed by: FAMILY MEDICINE

## 2025-04-22 PROCEDURE — 99212 OFFICE O/P EST SF 10 MIN: CPT | Performed by: FAMILY MEDICINE

## 2025-04-22 NOTE — LETTER
April 22, 2025     Patient: Akosua Gore   YOB: 1965   Date of Visit: 4/22/2025       To Whom It May Concern:    It is my medical opinion that Akosua Gore  may return to work with continued light duty for 6 weeks .    If you have any questions or concerns, please don't hesitate to call.         Sincerely,          Cole C Budinsky, MD

## 2025-04-23 NOTE — PROGRESS NOTES
Follow-Up Patient Visit  Assessment & Plan  Lateral epicondylitis of left elbow  Chronic lateral epicondylitis of the left elbow with pain exacerbated by pronation, supination, and wrist flexion. Tenderness is present over the lateral epicondyle and proximal common extensor tendon. Differential diagnosis includes stiffness, tendonitis, or discomfort due to favoring the arm. Current management is limited due to insurance issues, awaiting determination for potential treatment options. If insurance approval is obtained, potential treatments include injections, oral steroids, and muscle work.  - Use acetaminophen for pain management.  - Consider over-the-counter topical analgesics like Biofreeze or Icy Hot.  - Use a compression sleeve or a rolled-up tube sock for compression support.  - Follow-up in two weeks to determine the next steps based on insurance determination.  - Provide a work note for continued light duty work.  No orders of the defined types were placed in this encounter.       Physical Exam:  GENERAL:  Patient is awake, alert, and oriented to person place and time.  Patient appears well nourished and well kept.  Affect Calm, Not Acutely Distressed.  HEENT:  Normocephalic, Atraumatic, EOMI  CARDIOVASCULAR:  Hemodynamically stable.  RESPIRATORY:  Normal respirations with unlabored breathing.  NEURO: Gross sensation intact to the upper extremities bilaterally.  EXTREMITY: Left elbow exam as below  Physical Exam  MUSCULOSKELETAL: Tenderness on palpation over left lateral epicondyle and proximal common extensor tendon. Minimal discomfort to medial soft tissues of left elbow. Posterior lateral soft tissue pain in left elbow. No bony tenderness to olecranon or medial epicondyle of left elbow. 4/5 strength with resisted wrist extension on left.  strength 4/5 on left compared to contralateral limb. Pulses and sensation intact in left elbow. Limited pronation, supination, and wrist flexion strength on left  compared to contralateral limb.    IMAGING:   Results  RADIOLOGY  Left elbow MRI: Lateral epicondylitis (03/22/2025)    Procedures   Patient ID: Akosua Gore is a 60 y.o. female who presents for Pain and Follow-up of the Left Elbow ([facility], Lateral epicondylitis, Injection and therapy in pending status).  History of Present Illness  Akosua Gore is a 60 year old female who presents with left elbow pain and disability status concerns.    She experiences persistent pain in her left elbow, specifically around the lateral epicondyle and proximal common extensor tendon. The pain is exacerbated by movements such as rotating the arm and bringing it close to her body. She describes the pain as 'popping' and 'pinching' with some stiffness. Wearing a brace sometimes worsens the discomfort. No pain is reported on the inside of the elbow, but there is pain on the posterior lateral side.    An MRI was previously conducted, confirming epicondylitis, but subsequent Worker's Compensation issues have halted further treatment. She has been using Tylenol for pain management and has tried various over-the-counter muscle rubs and creams, including Biofreeze and Icy Hot, with limited relief.    She is currently awaiting a decision regarding her disability status, which affects her ability to receive further treatment and medication. Her employment insurance was canceled following the MRI, and she is not currently working due to her condition. She is in a state of uncertainty, waiting for a decision by Friday, which will determine her next steps regarding treatment and potential appeal if denied.      At the conclusion of the visit there were no further questions by the patient/family regarding their plan of care.  Patient was instructed to call or return with any issues, questions, or concerns regarding their injury and/or treatment plan described above.     This medical note was created with the assistance of artificial intelligence (AI) for  documentation purposes. The content has been reviewed and confirmed by the healthcare provider for accuracy and completeness. Patient consented to the use of audio recording and use of AI during their visit.   04/22/25 at 9:18 PM - Cole C Budinsky, MD  Office:  [phone]

## 2025-05-14 ENCOUNTER — OFFICE VISIT (OUTPATIENT)
Dept: ORTHOPEDIC SURGERY | Facility: CLINIC | Age: 60
End: 2025-05-14
Payer: COMMERCIAL

## 2025-05-14 DIAGNOSIS — S56.912A ELBOW STRAIN, LEFT, INITIAL ENCOUNTER: ICD-10-CM

## 2025-05-14 DIAGNOSIS — S56.912A FOREARM STRAIN, LEFT, INITIAL ENCOUNTER: ICD-10-CM

## 2025-05-14 DIAGNOSIS — M77.12 LATERAL EPICONDYLITIS OF LEFT ELBOW: Primary | ICD-10-CM

## 2025-05-14 DIAGNOSIS — G56.22 ULNAR NEURITIS, LEFT: ICD-10-CM

## 2025-05-14 PROCEDURE — 99212 OFFICE O/P EST SF 10 MIN: CPT | Performed by: FAMILY MEDICINE

## 2025-05-14 NOTE — PROGRESS NOTES
Follow-Up Patient Visit: Upper Extremity Injury  Assessment & Plan  Chronic lateral epicondylitis of the left elbow  Partial improvement noted. Discomfort and weakness persist. PRP therapy preferred over steroid injection. PRP expected to be painful initially but should aid healing. Numbing provided; pressure and fullness expected. Sling or brace may be needed post-injection.  - Submit C9 for physical therapy and PRP injection approval.  - Advise her to bring a sling and brace to the next appointment for potential use post-PRP injection.  - Plan for PRP injection at the [facility] office once approved.  - Consider another round of steroid pack and possibly naproxen or meloxicam after two weeks, pending approval.  - Encourage her to start physical therapy as soon as possible, once eligible with respect to her claim      [facility] summary: We are informed today that the patient had won her appeal, we have scanned in the associated documentation.  Left lateral elbow pain will submit C9 prior authorization for physical therapy and PRP injection we will see her in [facility] office once approved she will call and once able to get her medications and we will send them to the pharmacy will consider Medrol Dosepak followed by 2 weeks of naproxen at that time.  Will continue with current light duty work note x 6 weeks.  Will plan for full return to duty after completion of physical therapy and several weeks after the planned PRP injection which should be satisfactory timeframe to return to full work without restriction going forward.  No orders of the defined types were placed in this encounter.    1. Lateral epicondylitis of left elbow    2. Forearm strain, left, initial encounter    3. Elbow strain, left, initial encounter    4. Ulnar neuritis, left      Procedures  At the conclusion of the visit there were no further questions by the patient/family regarding their plan of care.  Patient was instructed to call or return with any  issues, questions, or concerns regarding their injury and/or treatment plan described above.    PHYSICAL EXAM:  Neuro: Gross sensation intact to the upper extremities bilaterally.  Upper Extremity: Left upper extremity exam demonstrates skin which is warm pink well-perfused good distal pulses and sensation are intact throughout she still has tenderness palpation over the lateral epicondyle and the common extensor tendon bundle.  There is no medial elbow pain over the epicondyle or bursa 4 compartments are soft and compressible no olecranon pain 5 out of 5 strength with elbow extension and flexion 4 out of 5 strength wrist extension 5 out of 5 wrist flexion, mild pain and 4 out of 5 strength with resisted pronation and supination.   strength is otherwise equal symmetric and intact bilaterally.  Physical Exam  MUSCULOSKELETAL: Good  strength in hand. Good wrist strength. Good strength in wrist extension.    IMAGING:   Results    MR elbow left wo IV contrast  Narrative: Interpreted By:  Vj Avila,   STUDY:  MR ELBOW LEFT WO IV CONTRAST; ;  11/29/2024 1:34 pm      INDICATION:  Signs/Symptoms:pain. Lifting injury with sudden onset pain and  popping posteriorly. Elbow pain in knocking with numbness and  tingling.      COMPARISON:  Plain film examination of 11/13/2024      ACCESSION NUMBER(S):  [id number]      ORDERING CLINICIAN:  COLE BUDINSKY      TECHNIQUE:  Multiplanar and multisequential MR images of the left elbow are  performed. There is mild motion degradation on T2 weighted sequences.      FINDINGS:  There is a small amount of joint fluid present. There is no sizable  effusion.      There is no sign of acute osseous fracture, bone marrow edema, or  osseous mass. There is no osteochondral lesion of the capitellum.  There is a tiny focus of subarticular degenerative signal in the  medial aspect of the radial head.      There is minimal intermediate signal at the biceps tendon insertion.  There  is no tendon discontinuity or retraction. There is no  surrounding fluid. The brachialis tendon insertion is intact. The  triceps tendon insertion is intact. There is intermediate to  increased intrasubstance signal at the common extensor tendon origin  with some increased signal towards the lateral surface. The  appearance is compatible with tendinopathy and interstitial tearing.  There is no full-thickness tendon tear or retraction. There is some  intermediate intrasubstance signal in the radial collateral ligament  though no full-thickness tear. The common flexor tendon origin and  ulnar collateral ligament are intact.      There is no sign of intramuscular mass or fluid collection. There is  no deep fascial fluid. The subcutaneous fat is unremarkable.      There is no mass or fluid collection in the cubital tunnel.      Impression: Tendinosis and interstitial tearing at the common extensor tendon  origin. Correlate with clinical findings of lateral epicondylitis.      Minimal insertional biceps tendinopathy. There is no tendon tear.      No acute osseous abnormality.              MACRO:  None      Signed by: Vj Avila 11/29/2024 2:13 PM  Dictation workstation:   [id number]       HPI  Patient ID: Akosua Gore is a 60 y.o. female who presents for Follow-up and Worker's Compensation of the Left Elbow (Lateral epicondylitis ).  History of Present Illness  Akosua Gore is a 60 year old female who presents for follow-up regarding PRP therapy and physical therapy for a wrist condition.    Her wrist condition is showing slight improvement, with increased ability to straighten it, although not completely. She experiences some popping and cracking but no pain radiating to the wrist and denies any bony pain.    She has not received a steroid injection previously and prefers PRP therapy. Her workers' compensation insurance is likely to approve the PRP therapy, as it is the only insurance company that will cover  it.    She is left-handed and is considering bringing someone to drive her after the procedure for support. Currently, she is unable to work due to restrictions and has submitted all necessary documentation to her employer and workers' compensation. She is awaiting back pay. She has already scheduled an appointment for physical therapy and is awaiting approval for the PRP injection.            This medical note was created with the assistance of artificial intelligence (AI) for documentation purposes. The content has been reviewed and confirmed by the healthcare provider for accuracy and completeness. Patient consented to the use of audio recording and use of AI during their visit.   05/14/25 at 3:30 PM - Cole C Budinsky, MD  Office:  [phone]

## 2025-05-14 NOTE — LETTER
May 14, 2025     Patient: Akosua Gore   YOB: 1965   Date of Visit: 5/14/2025       To Whom It May Concern:    It is my medical opinion that Akosua Gore may return to light duty immediately with the following restrictions: No use of the left upper extremity for the next 6 weeks .    If you have any questions or concerns, please don't hesitate to call.         Sincerely,          Cole C Budinsky, MD    CC: Chrissy Oconnor MA

## 2025-05-29 ENCOUNTER — TELEPHONE (OUTPATIENT)
Dept: ORTHOPEDIC SURGERY | Facility: CLINIC | Age: 60
End: 2025-05-29
Payer: COMMERCIAL

## 2025-05-29 NOTE — TELEPHONE ENCOUNTER
Patient called wondering if PRP was allowed, stated in last note that they had spoke about it.  Left detailed message

## 2025-06-09 ENCOUNTER — HOSPITAL ENCOUNTER (OUTPATIENT)
Dept: RADIOLOGY | Facility: EXTERNAL LOCATION | Age: 60
Discharge: HOME | End: 2025-06-09

## 2025-06-09 ENCOUNTER — APPOINTMENT (OUTPATIENT)
Dept: ORTHOPEDIC SURGERY | Facility: CLINIC | Age: 60
End: 2025-06-09
Payer: COMMERCIAL

## 2025-06-09 DIAGNOSIS — M77.12 LATERAL EPICONDYLITIS OF LEFT ELBOW: Primary | ICD-10-CM

## 2025-06-09 PROCEDURE — 0232T NJX PLATELET PLASMA: CPT | Performed by: FAMILY MEDICINE

## 2025-06-09 RX ORDER — TRAMADOL HYDROCHLORIDE 50 MG/1
50 TABLET, FILM COATED ORAL EVERY 8 HOURS PRN
Qty: 10 TABLET | Refills: 0 | Status: SHIPPED | OUTPATIENT
Start: 2025-06-09 | End: 2025-06-16

## 2025-06-09 RX ORDER — CYCLOBENZAPRINE HCL 10 MG
10 TABLET ORAL NIGHTLY PRN
Qty: 14 TABLET | Refills: 0 | Status: SHIPPED | OUTPATIENT
Start: 2025-06-09

## 2025-06-09 NOTE — PROGRESS NOTES
Patient ID: Akosua Gore is a 60 y.o. female.    Procedures    Patient presents here today for PRP injection to the left elbow.  Bango GPS 3 mini platelet concentration system reference #[id number] Lot #[id number]    60 mL venous draw yielded 3 mL PRP or concentrated platelets.  0.5 mL were injected to the symptomatic lateral epicondyle via ultrasound with routine sterile precautions. Patient will was having significant amount of pain and asked for the procedure to be discontinued.  She was provided with a simple Ace bandage and has sling and her trauma splint available to her.  She will begin physical therapy on Wednesday as tolerated.  I will plan to see her back in 6 weeks for final evaluation and likely clearance going forward.    Routine postprocedure precautions were provided, recommended to hold off of anti-inflammatories and icing for the next 2 weeks especially can.  Will provide a short course of tramadol and a muscle relaxer as ordered to assist with symptomatic discomfort.  Topical muscle rubs creams and heat are recommended.      At the conclusion of the visit there were no further questions by the patient/family regarding their plan of care.  Patient was instructed to call or return with any issues, questions, or concerns regarding their injury and/or treatment plan described above.    This note was prepared using voice recognition software.  The details of this note are correct and have been reviewed, and corrected to the best of my ability.  Some grammatical errors may persist related to the Dragon software     Cole C. Budinsky, MD  Office: [phone]

## 2025-06-09 NOTE — LETTER
June 9, 2025     Patient: Akosua Gore   YOB: 1965   Date of Visit: 6/9/2025       To Whom It May Concern:    It is my medical opinion that Akosua Gore should remain off of work for 6 weeks pending follow up visit.    If you have any questions or concerns, please don't hesitate to call.         Sincerely,          Cole C Budinsky, MD    CC: Chrissy Oconnor MA

## 2025-06-10 ENCOUNTER — TELEPHONE (OUTPATIENT)
Dept: ORTHOPEDIC SURGERY | Facility: CLINIC | Age: 60
End: 2025-06-10
Payer: COMMERCIAL

## 2025-06-10 NOTE — TELEPHONE ENCOUNTER
Patient called about her medication that was sent to the pharmacy. It is through [facility], self insured  She told me I needed to call the pharmacy and give her this code  [facility] [phone]    I called the pharmacy and gave them this code. I was instructed by the pharmacy that all the patient needs to do is bring her card in.    I called the patient  to tell her to take her card to the pharmacy to get the rx's filled    [employer] 31 was also filled out and faxed

## 2025-06-22 ENCOUNTER — HOSPITAL ENCOUNTER (EMERGENCY)
Age: 60
Discharge: HOME | End: 2025-06-23
Attending: STUDENT IN AN ORGANIZED HEALTH CARE EDUCATION/TRAINING PROGRAM

## 2025-06-22 VITALS
OXYGEN SATURATION: 95 % | SYSTOLIC BLOOD PRESSURE: 183 MMHG | TEMPERATURE: 98.2 F | RESPIRATION RATE: 20 BRPM | DIASTOLIC BLOOD PRESSURE: 90 MMHG | BODY MASS INDEX: 22.11 KG/M2 | HEART RATE: 78 BPM | HEIGHT: 63 IN | WEIGHT: 124.8 LBS

## 2025-06-22 DIAGNOSIS — S43.101A CLOSED DISLOCATION OF RIGHT ACROMIOCLAVICULAR JOINT, INITIAL ENCOUNTER: Primary | ICD-10-CM

## 2025-06-22 DIAGNOSIS — S42.031A CLOSED DISPLACED FRACTURE OF ACROMIAL END OF RIGHT CLAVICLE, INITIAL ENCOUNTER: ICD-10-CM

## 2025-06-22 PROCEDURE — 99283 EMERGENCY DEPT VISIT LOW MDM: CPT

## 2025-06-22 ASSESSMENT — PAIN DESCRIPTION - DESCRIPTORS: DESCRIPTORS: ACHING

## 2025-06-22 ASSESSMENT — PAIN SCALES - GENERAL: PAINLEVEL_OUTOF10: 10

## 2025-06-22 ASSESSMENT — PAIN DESCRIPTION - LOCATION: LOCATION: BACK;SHOULDER

## 2025-06-22 ASSESSMENT — PAIN DESCRIPTION - ORIENTATION: ORIENTATION: RIGHT

## 2025-06-22 ASSESSMENT — PAIN - FUNCTIONAL ASSESSMENT: PAIN_FUNCTIONAL_ASSESSMENT: 0-10

## 2025-06-23 ENCOUNTER — APPOINTMENT (OUTPATIENT)
Dept: CT IMAGING | Age: 60
End: 2025-06-23

## 2025-06-23 ENCOUNTER — APPOINTMENT (OUTPATIENT)
Dept: GENERAL RADIOLOGY | Age: 60
End: 2025-06-23

## 2025-06-23 PROCEDURE — 6370000000 HC RX 637 (ALT 250 FOR IP): Performed by: STUDENT IN AN ORGANIZED HEALTH CARE EDUCATION/TRAINING PROGRAM

## 2025-06-23 PROCEDURE — 73030 X-RAY EXAM OF SHOULDER: CPT

## 2025-06-23 PROCEDURE — 73060 X-RAY EXAM OF HUMERUS: CPT

## 2025-06-23 RX ORDER — NAPROXEN 500 MG/1
500 TABLET ORAL 2 TIMES DAILY PRN
Qty: 30 TABLET | Refills: 0 | Status: SHIPPED | OUTPATIENT
Start: 2025-06-23

## 2025-06-23 RX ORDER — OXYCODONE AND ACETAMINOPHEN 5; 325 MG/1; MG/1
1 TABLET ORAL EVERY 6 HOURS PRN
Qty: 9 TABLET | Refills: 0 | Status: SHIPPED | OUTPATIENT
Start: 2025-06-23 | End: 2025-06-26

## 2025-06-23 RX ORDER — OXYCODONE AND ACETAMINOPHEN 5; 325 MG/1; MG/1
1 TABLET ORAL ONCE
Refills: 0 | Status: COMPLETED | OUTPATIENT
Start: 2025-06-23 | End: 2025-06-23

## 2025-06-23 RX ADMIN — OXYCODONE AND ACETAMINOPHEN 1 TABLET: 5; 325 TABLET ORAL at 00:09

## 2025-06-23 NOTE — ED PROVIDER NOTES
[facility] EMERGENCY DEPARTMENT  EMERGENCY DEPARTMENT ENCOUNTER      Pt Name: Violet Garcia  MRN: [id number]  Birthdate 1965  Date of evaluation: 6/22/2025  Provider: Mitch Edgar MD  11:34 PM    CHIEF COMPLAINT       Chief Complaint   Patient presents with    Shoulder Injury     Back pain         HISTORY OF PRESENT ILLNESS    Violet Garcia is a 60 y.o. female who presents to the emergency department fall with right shoulder injury    HPI  Patient is a 60-year-old female presenting to ED due to right shoulder injury.  Patient endorsed that this occurred about a day ago.  She was standing on top of her tractor, about 5 feet up when she fell, striking her right shoulder off of the tractor.  She endorsed that she also struck the back of her head off the cement but did not lose consciousness.  Since then for the past days she has had bruising and swelling to the right shoulder.  She denies any injury to the right elbow or wrist.  Endorses a lump in the back of her head.  No blood thinner usage.  She denies any lightness, dizziness, vision changes, neck stiffness, vomiting or nausea.    Nursing Notes were reviewed.    REVIEW OF SYSTEMS       Review of Systems   Constitutional:  Negative for activity change, appetite change, chills, fatigue and fever.   HENT:  Negative for congestion, postnasal drip and rhinorrhea.    Eyes:  Negative for photophobia, pain, discharge, redness and itching.   Respiratory:  Negative for cough, chest tightness and wheezing.    Cardiovascular:  Negative for chest pain, palpitations and leg swelling.   Gastrointestinal:  Negative for abdominal distention, abdominal pain, nausea and vomiting.   Endocrine: Negative for cold intolerance and heat intolerance.   Genitourinary:  Negative for decreased urine volume, dysuria, flank pain, frequency, hematuria and urgency.   Musculoskeletal:  Positive for arthralgias. Negative for back pain, gait problem, joint swelling, myalgias, neck pain and neck

## 2025-06-23 NOTE — DISCHARGE INSTRUCTIONS
You were seen in the ER today due to right shoulder injury.  X-ray imaging shows that the distal portion of the right clavicle is fractured and mildly angulated.  He also do have separation of the acromioclavicular joint where the ligament is present, you may have injured this ligament.  We placed you in a sling and swath and prescribe this medications for home.  It is very pertinent that you follow-up with orthopedic care for further management of the fracture.  Please return if develop any worsening swelling and redness to the shoulder, numbness down the right arm or weakness to the right elbow and wrist.    Several of these types of fractures and injuries are treated with early range of motion exercises and potentially open reduction internal fixation which is a surgical procedure.  Your orthopedic with follow-up we will go over the plan for this with you.

## 2025-06-23 NOTE — ED TRIAGE NOTES
Pt presents to ER from home with a fall that occurred yesterday while she was putting a light bulb in her garage and she slipped and fell onto her tractor hitting her head, right shoulder and back. Pt stated that she is not on blood thinners, did not have LOC and has tried OTC medication that has not helped. Pt has limited ROM in her shoulder, stated it keeps popping. Pt is A&Ox4, warm and dry with vitals stable at this time.

## 2025-06-24 ASSESSMENT — ENCOUNTER SYMPTOMS
COUGH: 0
ABDOMINAL DISTENTION: 0
BACK PAIN: 0
VOMITING: 0
EYE ITCHING: 0
EYE DISCHARGE: 0
CHEST TIGHTNESS: 0
PHOTOPHOBIA: 0
WHEEZING: 0
COLOR CHANGE: 0
RHINORRHEA: 0
NAUSEA: 0
ABDOMINAL PAIN: 0
EYE REDNESS: 0
EYE PAIN: 0

## 2025-06-25 NOTE — PROGRESS NOTES
History: Akosua is here for her right shoulder.  She was standing on a riding lawnmower 9 days ago and fell off.  She was seen in the ED 2 days later and found to have a distal clavicle fracture.  She was given a sling and has been icing.  She is here as a new patient.    Past medical history: Multiple  Medications: Multiple  Allergies: No known drug allergies    Please refer to the intake H&P regarding the patient's review of systems, family history and social history as was done today    HEENT: Normal  Lungs: Clear to auscultation  Heart: RRR  Abdomen: Soft, nontender  Skin: clear  Extremity: She has swelling and ecchymosis around the proximal shoulder down into the upper arm.  She is able to wiggle the fingers and make a fist.  She has decent elbow range of motion.  She is neurovascularly intact.  Contralateral exam is normal for strength, motion, stability and neurovascular assessment.    Radiographs: 3 view right shoulder x-rays show fracture at the distal end of the right clavicle with mild displacement of the proximal fragment.    Assessment: Right distal third clavicle fracture, 9 days out    Plan: We will get her a better sling today.  We discussed both operative and nonoperative treatment options.  All questions were answered today with the patient.    For complete plan and/or surgical details, please refer to Dr. Canchola's portion of this split/shared dictation.     Sakina Murrieta PA-C    In a face-to-face encounter today, SHASHA Canchola MD performed a history and physical examination, discussed pertinent diagnostic studies if indicated, and discussed diagnosis and management strategies with both the patient and the midlevel provider.  I reviewed the midlevel's note and agree with the documented findings and plan of care.  Greater than 50% of the evaluation and treatment decision was performed by the physician myself during today's visit.    Akosua is here for her right shoulder.  She sustained a  distal clavicle fracture and was seen at [facility].  She is not wearing her sling as she feels its uncomfortable.  We discussed her x-rays showing the comminuted distal clavicle fracture with relatively stable alignment.  She understand however that this fracture can migrate and she may require surgical fixation.  Will get her a better sling for support.  She will ice aggressively and she was given some pain medicine today.  She will follow-up in 7 to 10 days for recheck with x-rays to assess for fracture stability.  Again if there is displacement and may need to consider further intervention.      This note was generated with voice recognition software and may contain grammatical errors.

## 2025-06-30 ENCOUNTER — OFFICE VISIT (OUTPATIENT)
Dept: ORTHOPEDIC SURGERY | Facility: CLINIC | Age: 60
End: 2025-06-30

## 2025-06-30 DIAGNOSIS — S42.031A DISPLACED FRACTURE OF LATERAL END OF RIGHT CLAVICLE, INITIAL ENCOUNTER FOR CLOSED FRACTURE: Primary | ICD-10-CM

## 2025-06-30 PROCEDURE — 99212 OFFICE O/P EST SF 10 MIN: CPT | Mod: 25 | Performed by: ORTHOPAEDIC SURGERY

## 2025-06-30 PROCEDURE — 99214 OFFICE O/P EST MOD 30 MIN: CPT | Performed by: ORTHOPAEDIC SURGERY

## 2025-06-30 PROCEDURE — 23500 CLTX CLAVICULAR FX W/O MNPJ: CPT | Performed by: ORTHOPAEDIC SURGERY

## 2025-06-30 RX ORDER — OXYCODONE AND ACETAMINOPHEN 5; 325 MG/1; MG/1
1 TABLET ORAL EVERY 6 HOURS PRN
Qty: 15 TABLET | Refills: 0 | Status: SHIPPED | OUTPATIENT
Start: 2025-06-30 | End: 2025-07-07

## 2025-07-06 NOTE — PROGRESS NOTES
History: Akosua is here for her right clavicle.  She is 2 weeks out from a distal clavicle fracture.  She has been wearing her sling.  She does feel like the pain and popping she was having are improving.  She still gets some soreness with certain motion.  She is here for follow-up.      Physical Exam: Her swelling and ecchymosis are resolving.  She has full elbow and hand range of motion.  She has appropriate passive range of motion at the side.  No numbness or tingling.    Radiographs: 2 view clavicle x-rays show no change in positioning of her distal clavicle fracture with no significant callus formation noted.    Assessment: Stable right distal clavicle fracture, 2 weeks out    Plan: Her fracture is stable.  We are can continue to can treat her conservatively.  She is going to continue to wear the sling at all times except for hygiene purposes.  She can continue to work on elbow and hand range of motion.  She can also do some gentle pendulums when the sling is off for showers.  She should avoid any overhead lifting or weights.  She will continue with an icing regimen.  We will see her back in another 3 weeks assess progress with 2 view clavicle x-rays.    All questions were answered with the patient.   This note was generated with voice recognition software and may contain grammatical errors.

## 2025-07-07 ENCOUNTER — HOSPITAL ENCOUNTER (OUTPATIENT)
Dept: RADIOLOGY | Facility: CLINIC | Age: 60
Discharge: HOME | End: 2025-07-07
Payer: COMMERCIAL

## 2025-07-07 ENCOUNTER — OFFICE VISIT (OUTPATIENT)
Dept: ORTHOPEDIC SURGERY | Facility: CLINIC | Age: 60
End: 2025-07-07
Payer: COMMERCIAL

## 2025-07-07 DIAGNOSIS — S42.031A DISPLACED FRACTURE OF LATERAL END OF RIGHT CLAVICLE, INITIAL ENCOUNTER FOR CLOSED FRACTURE: ICD-10-CM

## 2025-07-07 PROCEDURE — 73000 X-RAY EXAM OF COLLAR BONE: CPT | Mod: RT

## 2025-07-07 PROCEDURE — 99212 OFFICE O/P EST SF 10 MIN: CPT | Performed by: ORTHOPAEDIC SURGERY

## 2025-07-21 ENCOUNTER — APPOINTMENT (OUTPATIENT)
Dept: ORTHOPEDIC SURGERY | Facility: CLINIC | Age: 60
End: 2025-07-21
Payer: COMMERCIAL

## 2025-07-23 ENCOUNTER — APPOINTMENT (OUTPATIENT)
Dept: ORTHOPEDIC SURGERY | Facility: CLINIC | Age: 60
End: 2025-07-23
Payer: COMMERCIAL

## 2025-07-23 DIAGNOSIS — M77.12 LATERAL EPICONDYLITIS OF LEFT ELBOW: ICD-10-CM

## 2025-07-23 DIAGNOSIS — G90.50 RSD (REFLEX SYMPATHETIC DYSTROPHY): ICD-10-CM

## 2025-07-23 PROCEDURE — 99212 OFFICE O/P EST SF 10 MIN: CPT | Performed by: FAMILY MEDICINE

## 2025-07-23 RX ORDER — TRAMADOL HYDROCHLORIDE 50 MG/1
50 TABLET, FILM COATED ORAL EVERY 12 HOURS PRN
Qty: 14 TABLET | Refills: 0 | Status: SHIPPED | OUTPATIENT
Start: 2025-07-23 | End: 2025-07-30

## 2025-07-23 NOTE — LETTER
July 23, 2025     Patient: Akosua Gore   YOB: 1965   Date of Visit: 7/23/2025       To Whom It May Concern:    It is my medical opinion that Akosua Gore should remain off of work for 6 weeks pending follow up visit.    If you have any questions or concerns, please don't hesitate to call.         Sincerely,        Cole C Budinsky, MD    CC: Chrissy Oconnor MA

## 2025-07-23 NOTE — PROGRESS NOTES
Follow-Up Patient Visit: Upper Extremity Injury  Assessment & Plan  Reflex Sympathetic Dystrophy (RSD)  RSD recurred in left hand with swelling and pain, possibly worsened by PRP injection. Previous treatments ineffective. History of RSD post-carpal tunnel surgery.  - Refer to pain management specialist for RSD evaluation and management.  C9 prior authorization required.  - Consider reactivating RSD diagnosis for worker's compensation claim.  Will submit a motion for this today.  - Prescribed tramadol, 14 tablets, every 12 hours as needed for pain.  - Coordinate with our team for tramadol prescription processing for worker's compensation.  C9 prior authorization for repeat occupational therapy prescription for both the RSD and a lateral epicondylitis.    Lateral epicondylitis of the left elbow  Persistent pain despite rest, possibly prolonged by RSD. MRI showed partial tearing, expected to heal with rest.  - Encourage elbow movement to prevent stiffness.  - Avoid sling use.  Will plan to hold off on any injection to the elbow as previous PRP injection was significantly painful and debilitating to her  Will plan for repeat evaluation in 6 weeks, no need for x-rays at that time.  Refill Occupational Therapy prescription.    Carpal tunnel syndrome  Previous surgery led to RSD. No current swelling in right hand. Wrist braces available.  - Use wrist braces as needed.        Orders Placed This Encounter   • Referral to Occupational Therapy   • Referral to Pain Management   • traMADol (Ultram) 50 mg tablet     1. Lateral epicondylitis of left elbow    2. RSD (reflex sympathetic dystrophy)      Procedures  At the conclusion of the visit there were no further questions by the patient/family regarding their plan of care.  Patient was instructed to call or return with any issues, questions, or concerns regarding their injury and/or treatment plan described above.    PHYSICAL EXAM:  Neuro: Gross sensation intact to the upper  extremities bilaterally.  Upper Extremity: Left elbow with full flexion extension with minimal discomfort with pronation and supination.  Very limited exam due to swelling and severe pain and soft tissue discomfort about the hand and wrist otherwise.  The previous injection site is without issue there is no bruising redness or erythema.  No joint effusion noted.  Physical Exam      IMAGING:   Results  RADIOLOGY  No new imaging today       HPI  Patient ID: Akosua Gore is a 60 y.o. female who presents for Follow-up, Worker's Compensation, and Injection Follow-up of the Left Elbow (S/p PRP inj. - 06/09/25/Lateral epicondylitis ).  History of Present Illness  Akosua Gore is a 60 year old female with reflex sympathetic dystrophy who presents with severe swelling and pain in her left hand.    She experiences severe swelling and pain in her left hand. The pain is severe, radiates down her arm, and the swelling worsens with activity, such as squeezing a sponge. Neither ice nor heat provides relief.    Her medical history includes carpal tunnel syndrome diagnosed in September 1996, for which she underwent surgery on both wrists. Post-surgery, she received fourteen nerve blocks for RSD, which were discontinued due to lack of efficacy. After six months of rest, her symptoms had previously resolved, allowing her to return to work.    She has tried various treatments, including oral steroids and cortisone injections, which did not alleviate the swelling or pain. Tramadol has been helpful for pain relief, but gabapentin and lidocaine creams have not been effective. She avoids using heat due to adverse reactions and prefers rest as a management strategy.    Her right hand does not swell, and she has not experienced similar issues on that side. She has a history of Raynaud's phenomenon, characterized by white spots on her fingers in response to cold. No history of thyroid issues or diabetes.            This medical note was created  with the assistance of artificial intelligence (AI) for documentation purposes. The content has been reviewed and confirmed by the healthcare provider for accuracy and completeness. Patient consented to the use of audio recording and use of AI during their visit.   07/23/25 at 10:38 PM - Cole C Budinsky, MD  Office:  [phone]

## 2025-07-24 NOTE — PROGRESS NOTES
History: Akosua is here for her right clavicle. She is 5 weeks out from a distal clavicle fracture.     Past medical history: Multiple  Medications: Multiple  Allergies: No known drug allergies    Please refer to the intake H&P regarding the patient's review of systems, family history and social history as was done today    HEENT: Normal  Lungs: Clear to auscultation  Heart: RRR  Abdomen: Soft, nontender  Skin: clear  Extremity: []   Contralateral exam is normal for strength, motion, stability and neurovascular assessment.    Radiographs: 2 view clavicle x-rays show no change in positioning of her distal clavicle fracture with no significant callus formation noted.     Assessment: Stable right distal clavicle fracture, 5 weeks out     Plan: []   All questions were answered today with the patient.    Scribe Attestation:  By signing my name below, I, Rivera Christian attest that this documentation has been prepared under the direction and in the presence of Juan Luis Canchola MD.    Provider Attestation - Scribe documentation:  All medical record entries made by Patricia Zafar were at my direction and personally dictated by me, Juan Luis Canchola MD. I have reviewed the chart and agree that the record is accurate and I confirmed that it reflects my personal performance of the history, physical exam, discussion, and plan.

## 2025-07-30 ENCOUNTER — APPOINTMENT (OUTPATIENT)
Dept: ORTHOPEDIC SURGERY | Facility: CLINIC | Age: 60
End: 2025-07-30
Payer: COMMERCIAL

## 2025-07-30 DIAGNOSIS — S42.031A DISPLACED FRACTURE OF LATERAL END OF RIGHT CLAVICLE, INITIAL ENCOUNTER FOR CLOSED FRACTURE: ICD-10-CM

## 2025-08-05 ENCOUNTER — OFFICE VISIT (OUTPATIENT)
Facility: CLINIC | Age: 60
End: 2025-08-05
Payer: COMMERCIAL

## 2025-08-05 VITALS
WEIGHT: 122 LBS | RESPIRATION RATE: 17 BRPM | DIASTOLIC BLOOD PRESSURE: 93 MMHG | BODY MASS INDEX: 21.62 KG/M2 | HEART RATE: 63 BPM | HEIGHT: 63 IN | SYSTOLIC BLOOD PRESSURE: 164 MMHG

## 2025-08-05 DIAGNOSIS — G90.512 COMPLEX REGIONAL PAIN SYNDROME TYPE 1 OF LEFT UPPER EXTREMITY: ICD-10-CM

## 2025-08-05 PROCEDURE — 99204 OFFICE O/P NEW MOD 45 MIN: CPT | Performed by: PAIN MEDICINE

## 2025-08-05 RX ORDER — DULOXETIN HYDROCHLORIDE 30 MG/1
CAPSULE, DELAYED RELEASE ORAL
Qty: 67 CAPSULE | Refills: 0 | Status: SHIPPED | OUTPATIENT
Start: 2025-08-05 | End: 2025-09-11

## 2025-08-05 ASSESSMENT — PAIN SCALES - GENERAL
PAINLEVEL_OUTOF10: 10-WORST PAIN EVER
PAINLEVEL_OUTOF10: 10 - WORST POSSIBLE PAIN

## 2025-08-05 ASSESSMENT — PAIN - FUNCTIONAL ASSESSMENT: PAIN_FUNCTIONAL_ASSESSMENT: 0-10

## 2025-08-05 NOTE — PROGRESS NOTES
Patient ID: Akosua Gore is a 60 y.o. female with a past medical history of bilateral CRPS (following carpal tunnel release surgery) and Raynauds who presents for initial evaluation of Pain (Pt here today c/o left arm pain, no recent images, recent PT and will be starting OT). Referred by Dr. Budinsky who is physician or record. Workman's Comp case.     Patient reports her left elbow pain started acutely on 11/13/2024, was performing repetitive movement at work and felt a pop in her left elbow, had immediate pain and was taken to the ED. Was diagnosed with lateral epicondylitis and was following with Dr. Valentin for management. Underwent PRP injection approximately two months ago, had acute worsening of her left elbow pain and recurrence of her LUE CRPS symptoms. Endorsing allodynia, temperature asymmetry, LUE edema, and decreased ROM of the elbow and hand.     History of CRPS in bilateral upper extremities in 1996 following carpal tunnel release. Was treated with Gabapentin and Tramadol and underwent multiple stellate ganglion blocks, symptoms eventually resolved.     Onset, traumatic event: Work injury in November 2024   Location: left lateral elbow  Radiation: mid-forearm, left hand  Quality: sharp, stabbing, burning  Intensity: currently 10/10, at its worst 10/10  Exacerbated by: movement  Relieved by: heating pad, Tramadol    Treatment To Date:  - Physical therapy: Finished PT, starts OT     - Home exercise program after PT: denies  - Previous injections/interventions: Prior Stellate Ganglion blocks  - Medications: Tramadol, Tylenol, Gabapentin previously but did not tolerate due to changes in mood    Patient denies bowel/bladder incontinence, denies fever, denies unintentional weight loss, denies clumsiness of hands, feet, or dropping things.  Denies any constitutional or myelopathic symptomatology.                                       Imaging:  === 11/29/24 ===    MR ELBOW LEFT WO IV CONTRAST    -  "Impression -  Tendinosis and interstitial tearing at the common extensor tendon  origin. Correlate with clinical findings of lateral epicondylitis.    Minimal insertional biceps tendinopathy. There is no tendon tear.    No acute osseous abnormality.    No results found for: \"HGBA1C\"    Current Outpatient Medications   Medication Instructions    cyclobenzaprine (FLEXERIL) 10 mg, oral, Nightly PRN    lidocaine (Lidoderm) 5 % patch 1 patch, transdermal, Daily, Remove & discard patch within 12 hours or as directed by MD.    methylPREDNISolone (Medrol Dospak) 4 mg tablets Follow schedule on package instructions    methylPREDNISolone (Medrol Dospak) 4 mg tablets Use as directed by package instructions        Medical History[1]     Surgical History[2]     Family History[3]     RX Allergies[4]     Objective     Vitals:    08/05/25 1306   BP: (!) 164/93   Pulse: 63   Resp: 17        Physical Exam    GENERAL EXAM  Vital Signs: Vital signs to include heart rate, respiration rate, blood pressure, and temperature were reviewed.  General Appearance:  Awake, alert, healthy appearing, well developed, No acute distress.  Head: Normocephalic without evidence of head injury.  Neck: The appearance of the neck was normal without swelling with a midline trachea.  Eyes: The eyelids and eyebrows exhibited no abnormalities.  Pupils were not pin-point.  Sclera was without icterus.  Lungs: Respiration rhythm and depth was normal.  Respiratory movements were normal without labored breathing.  Cardiovascular: No peripheral edema was present.    Neurological: Patient was oriented to time, place, and person.  Speech was normal.  Balance, gait, and stance were unremarkable.    Psychiatric: Appearance was normal with appropriate dress.  Mood was euthymic and affect was normal.  Skin: Affected regions were without ecchymosis or skin lesions.    Musculoskeletal Exam:  Moving bilateral upper extremities at least antigravity  Hyperalgesia and allodynia " of the left elbow and left dorsum of hand  Edema of the left hand compared with the right  Decreased ROM secondary to pain, LUE warmer than right    Assessment/Plan   Problem List Items Addressed This Visit    None  Visit Diagnoses         Codes      Complex regional pain syndrome type 1 of left upper extremity     G90.512          Akosua Gore is a 60 y.o. female with a past medical history of bilateral CRPS (following carpal tunnel release surgery) and Raynauds who presents for initial evaluation of left elbow and hand pain. Patient with recurrence of LUE complex regional pain syndrome following work injury in November 2024 and subsequent PRP injection two months ago. Dr. Budinsky is physician of record for [facility] case. Referred for management of CRPS Type I. Budnevaeh criteria present including allodynia, temperature asymmetry, LUE edema, and decreased ROM of the elbow and hand. Agree with scheduled OT, discussed importance of continued utilization of Left upper extremity and desensitization and ROM therapy provided. We will start Duloxetine 30mg Daily with titration instructions provided to increase to 60mg Daily. We will plan for a series of left stellate ganglion blocks w/ fluoroscopic guidance that will be both diagnostic and therapeutic. Discussed risks and benefits of procedure, patient wishes to proceed. Patient to discuss with physician of record to obtain  approval for treatment. Denies any further questions or concerns at this time.     Plan:   - Agree with OT for desensitization therapy and ROM of LUE in setting of CRPS  - Start Duloxetine 30mg Daily with titration instructions provided for eventual goal dose of 60mg Daily   - Plan for series of left stellate ganglion blocks if approved by [facility] and following obtaining C9  - Follow-up after procedure    Danielle Rosa DO   Chronic Pain Fellow    Patient seen and examined with attending, Dr. Dimas, who agrees with assessment and plan.         [1]   Past  Medical History:  Diagnosis Date    Tennis elbow    [2] No past surgical history on file.  [3] No family history on file.  [4] No Known Allergies

## 2025-08-28 ENCOUNTER — TRANSCRIBE ORDERS (OUTPATIENT)
Dept: ADMINISTRATIVE | Age: 60
End: 2025-08-28

## 2025-08-28 DIAGNOSIS — Z12.31 OTHER SCREENING MAMMOGRAM: Primary | ICD-10-CM
